# Patient Record
Sex: MALE | Race: WHITE | NOT HISPANIC OR LATINO | URBAN - METROPOLITAN AREA
[De-identification: names, ages, dates, MRNs, and addresses within clinical notes are randomized per-mention and may not be internally consistent; named-entity substitution may affect disease eponyms.]

---

## 2017-05-17 ENCOUNTER — OUTPATIENT (OUTPATIENT)
Dept: OUTPATIENT SERVICES | Facility: HOSPITAL | Age: 57
LOS: 1 days | End: 2017-05-17
Payer: COMMERCIAL

## 2017-05-17 PROCEDURE — A9552: CPT

## 2017-05-17 PROCEDURE — 78815 PET IMAGE W/CT SKULL-THIGH: CPT | Mod: 26

## 2017-05-17 PROCEDURE — 78815 PET IMAGE W/CT SKULL-THIGH: CPT

## 2017-05-25 ENCOUNTER — APPOINTMENT (OUTPATIENT)
Dept: PULMONOLOGY | Facility: CLINIC | Age: 57
End: 2017-05-25

## 2017-05-25 VITALS
TEMPERATURE: 97.9 F | HEART RATE: 77 BPM | OXYGEN SATURATION: 98 % | BODY MASS INDEX: 22.34 KG/M2 | DIASTOLIC BLOOD PRESSURE: 78 MMHG | WEIGHT: 139 LBS | SYSTOLIC BLOOD PRESSURE: 104 MMHG | HEIGHT: 66 IN

## 2017-05-25 VITALS — OXYGEN SATURATION: 98 % | HEART RATE: 75 BPM

## 2017-05-25 DIAGNOSIS — D49.59 NEOPLASM UNSPECIFIED BEHAVIOR OF OTHER GENITOURINARY ORGAN: ICD-10-CM

## 2017-05-25 DIAGNOSIS — R05 COUGH: ICD-10-CM

## 2017-05-25 DIAGNOSIS — Z87.891 PERSONAL HISTORY OF NICOTINE DEPENDENCE: ICD-10-CM

## 2017-05-25 DIAGNOSIS — C34.31 MALIGNANT NEOPLASM OF LOWER LOBE, RIGHT BRONCHUS OR LUNG: ICD-10-CM

## 2017-05-25 DIAGNOSIS — R59.0 LOCALIZED ENLARGED LYMPH NODES: ICD-10-CM

## 2017-05-26 ENCOUNTER — RESULT REVIEW (OUTPATIENT)
Age: 57
End: 2017-05-26

## 2017-05-26 ENCOUNTER — OUTPATIENT (OUTPATIENT)
Dept: OUTPATIENT SERVICES | Facility: HOSPITAL | Age: 57
LOS: 1 days | Discharge: HOME | End: 2017-05-26
Payer: COMMERCIAL

## 2017-05-26 ENCOUNTER — APPOINTMENT (OUTPATIENT)
Dept: PULMONOLOGY | Facility: HOSPITAL | Age: 57
End: 2017-05-26

## 2017-05-26 LAB
ANION GAP SERPL CALC-SCNC: 18 MMOL/L
APTT BLD: 32.6 SEC
BASOPHILS # BLD AUTO: 0.02 K/UL
BASOPHILS NFR BLD AUTO: 0.3 %
BUN SERPL-MCNC: 19 MG/DL
CALCIUM SERPL-MCNC: 10.1 MG/DL
CHLORIDE SERPL-SCNC: 101 MMOL/L
CO2 SERPL-SCNC: 23 MMOL/L
CREAT SERPL-MCNC: 1.16 MG/DL
EOSINOPHIL # BLD AUTO: 0.08 K/UL
EOSINOPHIL NFR BLD AUTO: 1.4 %
GLUCOSE SERPL-MCNC: 82 MG/DL
HCT VFR BLD CALC: 47.5 %
HGB BLD-MCNC: 15.1 G/DL
IMM GRANULOCYTES NFR BLD AUTO: 0.2 %
INR PPP: 0.96 RATIO
LYMPHOCYTES # BLD AUTO: 0.92 K/UL
LYMPHOCYTES NFR BLD AUTO: 16 %
MAN DIFF?: NORMAL
MCHC RBC-ENTMCNC: 28.5 PG
MCHC RBC-ENTMCNC: 31.8 GM/DL
MCV RBC AUTO: 89.8 FL
MONOCYTES # BLD AUTO: 0.32 K/UL
MONOCYTES NFR BLD AUTO: 5.6 %
NEUTROPHILS # BLD AUTO: 4.4 K/UL
NEUTROPHILS NFR BLD AUTO: 76.5 %
PLATELET # BLD AUTO: 252 K/UL
POTASSIUM SERPL-SCNC: 4.7 MMOL/L
PT BLD: 10.8 SEC
RBC # BLD: 5.29 M/UL
RBC # FLD: 13.7 %
SODIUM SERPL-SCNC: 142 MMOL/L
WBC # FLD AUTO: 5.75 K/UL

## 2017-05-26 PROCEDURE — 87206 SMEAR FLUORESCENT/ACID STAI: CPT

## 2017-05-26 PROCEDURE — 31653 BRONCH EBUS SAMPLNG 3/> NODE: CPT

## 2017-05-26 PROCEDURE — 88342 IMHCHEM/IMCYTCHM 1ST ANTB: CPT

## 2017-05-26 PROCEDURE — 87070 CULTURE OTHR SPECIMN AEROBIC: CPT

## 2017-05-26 PROCEDURE — 87015 SPECIMEN INFECT AGNT CONCNTJ: CPT

## 2017-05-26 PROCEDURE — 88341 IMHCHEM/IMCYTCHM EA ADD ANTB: CPT

## 2017-05-26 PROCEDURE — 88173 CYTOPATH EVAL FNA REPORT: CPT

## 2017-05-26 PROCEDURE — 87116 MYCOBACTERIA CULTURE: CPT

## 2017-05-26 PROCEDURE — 87102 FUNGUS ISOLATION CULTURE: CPT

## 2017-05-26 PROCEDURE — 88305 TISSUE EXAM BY PATHOLOGIST: CPT

## 2017-05-27 LAB
GRAM STN FLD: SIGNIFICANT CHANGE UP
NIGHT BLUE STAIN TISS: SIGNIFICANT CHANGE UP
SPECIMEN SOURCE: SIGNIFICANT CHANGE UP
SPECIMEN SOURCE: SIGNIFICANT CHANGE UP

## 2017-05-28 LAB
CULTURE RESULTS: SIGNIFICANT CHANGE UP
SPECIMEN SOURCE: SIGNIFICANT CHANGE UP

## 2017-05-30 LAB
NON-GYNECOLOGICAL CYTOLOGY STUDY: SIGNIFICANT CHANGE UP

## 2017-06-01 DIAGNOSIS — C77.1 SECONDARY AND UNSPECIFIED MALIGNANT NEOPLASM OF INTRATHORACIC LYMPH NODES: ICD-10-CM

## 2017-06-01 DIAGNOSIS — Z85.118 PERSONAL HISTORY OF OTHER MALIGNANT NEOPLASM OF BRONCHUS AND LUNG: ICD-10-CM

## 2017-06-24 LAB
CULTURE RESULTS: SIGNIFICANT CHANGE UP
SPECIMEN SOURCE: SIGNIFICANT CHANGE UP

## 2017-07-08 LAB
CULTURE RESULTS: SIGNIFICANT CHANGE UP
SPECIMEN SOURCE: SIGNIFICANT CHANGE UP

## 2018-02-03 ENCOUNTER — APPOINTMENT (OUTPATIENT)
Dept: CT IMAGING | Facility: HOSPITAL | Age: 58
End: 2018-02-03

## 2018-02-03 ENCOUNTER — OUTPATIENT (OUTPATIENT)
Dept: OUTPATIENT SERVICES | Facility: HOSPITAL | Age: 58
LOS: 1 days | End: 2018-02-03
Payer: COMMERCIAL

## 2018-02-03 PROCEDURE — 74177 CT ABD & PELVIS W/CONTRAST: CPT

## 2018-02-03 PROCEDURE — 71260 CT THORAX DX C+: CPT

## 2018-02-03 PROCEDURE — 70460 CT HEAD/BRAIN W/DYE: CPT | Mod: 26

## 2018-02-03 PROCEDURE — 70460 CT HEAD/BRAIN W/DYE: CPT

## 2018-02-03 PROCEDURE — 74177 CT ABD & PELVIS W/CONTRAST: CPT | Mod: 26

## 2018-02-03 PROCEDURE — 71260 CT THORAX DX C+: CPT | Mod: 26

## 2018-11-03 ENCOUNTER — APPOINTMENT (OUTPATIENT)
Dept: CT IMAGING | Facility: HOSPITAL | Age: 58
End: 2018-11-03
Payer: COMMERCIAL

## 2018-11-03 ENCOUNTER — OUTPATIENT (OUTPATIENT)
Dept: OUTPATIENT SERVICES | Facility: HOSPITAL | Age: 58
LOS: 1 days | End: 2018-11-03
Payer: COMMERCIAL

## 2018-11-03 PROCEDURE — 71260 CT THORAX DX C+: CPT | Mod: 26

## 2018-11-03 PROCEDURE — 71260 CT THORAX DX C+: CPT

## 2018-11-03 PROCEDURE — 74177 CT ABD & PELVIS W/CONTRAST: CPT | Mod: 26

## 2018-11-03 PROCEDURE — 74177 CT ABD & PELVIS W/CONTRAST: CPT

## 2019-01-31 ENCOUNTER — APPOINTMENT (OUTPATIENT)
Dept: CT IMAGING | Facility: HOSPITAL | Age: 59
End: 2019-01-31
Payer: COMMERCIAL

## 2019-01-31 ENCOUNTER — OUTPATIENT (OUTPATIENT)
Dept: OUTPATIENT SERVICES | Facility: HOSPITAL | Age: 59
LOS: 1 days | End: 2019-01-31
Payer: COMMERCIAL

## 2019-01-31 PROCEDURE — 71260 CT THORAX DX C+: CPT

## 2019-01-31 PROCEDURE — 74177 CT ABD & PELVIS W/CONTRAST: CPT | Mod: 26

## 2019-01-31 PROCEDURE — 70460 CT HEAD/BRAIN W/DYE: CPT | Mod: 26

## 2019-01-31 PROCEDURE — 71260 CT THORAX DX C+: CPT | Mod: 26

## 2019-01-31 PROCEDURE — 70460 CT HEAD/BRAIN W/DYE: CPT

## 2019-01-31 PROCEDURE — 74177 CT ABD & PELVIS W/CONTRAST: CPT

## 2019-03-20 ENCOUNTER — APPOINTMENT (OUTPATIENT)
Age: 59
End: 2019-03-20
Payer: COMMERCIAL

## 2019-03-20 ENCOUNTER — OUTPATIENT (OUTPATIENT)
Dept: OUTPATIENT SERVICES | Facility: HOSPITAL | Age: 59
LOS: 1 days | End: 2019-03-20
Payer: COMMERCIAL

## 2019-03-20 PROCEDURE — 70460 CT HEAD/BRAIN W/DYE: CPT | Mod: 26

## 2019-03-20 PROCEDURE — 70460 CT HEAD/BRAIN W/DYE: CPT

## 2019-05-24 ENCOUNTER — OUTPATIENT (OUTPATIENT)
Dept: OUTPATIENT SERVICES | Facility: HOSPITAL | Age: 59
LOS: 1 days | End: 2019-05-24
Payer: COMMERCIAL

## 2019-05-24 ENCOUNTER — APPOINTMENT (OUTPATIENT)
Dept: CT IMAGING | Facility: HOSPITAL | Age: 59
End: 2019-05-24
Payer: COMMERCIAL

## 2019-05-24 PROCEDURE — 74177 CT ABD & PELVIS W/CONTRAST: CPT | Mod: 26

## 2019-05-24 PROCEDURE — 71260 CT THORAX DX C+: CPT

## 2019-05-24 PROCEDURE — 70460 CT HEAD/BRAIN W/DYE: CPT | Mod: 26

## 2019-05-24 PROCEDURE — 74177 CT ABD & PELVIS W/CONTRAST: CPT

## 2019-05-24 PROCEDURE — 71260 CT THORAX DX C+: CPT | Mod: 26

## 2019-05-24 PROCEDURE — 70460 CT HEAD/BRAIN W/DYE: CPT

## 2019-09-16 ENCOUNTER — APPOINTMENT (OUTPATIENT)
Dept: CT IMAGING | Facility: HOSPITAL | Age: 59
End: 2019-09-16
Payer: COMMERCIAL

## 2019-09-17 ENCOUNTER — OUTPATIENT (OUTPATIENT)
Dept: OUTPATIENT SERVICES | Facility: HOSPITAL | Age: 59
LOS: 1 days | End: 2019-09-17
Payer: COMMERCIAL

## 2019-09-17 ENCOUNTER — APPOINTMENT (OUTPATIENT)
Dept: CT IMAGING | Facility: HOSPITAL | Age: 59
End: 2019-09-17

## 2019-09-17 PROCEDURE — 70460 CT HEAD/BRAIN W/DYE: CPT

## 2019-09-17 PROCEDURE — 70460 CT HEAD/BRAIN W/DYE: CPT | Mod: 26

## 2020-01-11 ENCOUNTER — APPOINTMENT (OUTPATIENT)
Dept: CT IMAGING | Facility: HOSPITAL | Age: 60
End: 2020-01-11
Payer: COMMERCIAL

## 2020-01-11 ENCOUNTER — OUTPATIENT (OUTPATIENT)
Dept: OUTPATIENT SERVICES | Facility: HOSPITAL | Age: 60
LOS: 1 days | End: 2020-01-11
Payer: COMMERCIAL

## 2020-01-11 PROCEDURE — 74177 CT ABD & PELVIS W/CONTRAST: CPT

## 2020-01-11 PROCEDURE — 71260 CT THORAX DX C+: CPT

## 2020-01-11 PROCEDURE — 74177 CT ABD & PELVIS W/CONTRAST: CPT | Mod: 26

## 2020-01-11 PROCEDURE — 71260 CT THORAX DX C+: CPT | Mod: 26

## 2020-01-13 ENCOUNTER — OUTPATIENT (OUTPATIENT)
Dept: OUTPATIENT SERVICES | Facility: HOSPITAL | Age: 60
LOS: 1 days | End: 2020-01-13
Payer: COMMERCIAL

## 2020-01-13 ENCOUNTER — APPOINTMENT (OUTPATIENT)
Dept: CT IMAGING | Facility: HOSPITAL | Age: 60
End: 2020-01-13
Payer: COMMERCIAL

## 2020-01-13 PROCEDURE — 70460 CT HEAD/BRAIN W/DYE: CPT

## 2020-01-13 PROCEDURE — 70460 CT HEAD/BRAIN W/DYE: CPT | Mod: 26

## 2020-08-05 ENCOUNTER — APPOINTMENT (OUTPATIENT)
Dept: CT IMAGING | Facility: HOSPITAL | Age: 60
End: 2020-08-05
Payer: COMMERCIAL

## 2020-08-05 ENCOUNTER — OUTPATIENT (OUTPATIENT)
Dept: OUTPATIENT SERVICES | Facility: HOSPITAL | Age: 60
LOS: 1 days | End: 2020-08-05
Payer: COMMERCIAL

## 2020-08-05 PROCEDURE — 71270 CT THORAX DX C-/C+: CPT

## 2020-08-05 PROCEDURE — 70470 CT HEAD/BRAIN W/O & W/DYE: CPT | Mod: 26

## 2020-08-05 PROCEDURE — 74177 CT ABD & PELVIS W/CONTRAST: CPT | Mod: 26

## 2020-08-05 PROCEDURE — 71270 CT THORAX DX C-/C+: CPT | Mod: 26

## 2020-08-05 PROCEDURE — 74177 CT ABD & PELVIS W/CONTRAST: CPT

## 2020-08-05 PROCEDURE — 70470 CT HEAD/BRAIN W/O & W/DYE: CPT

## 2021-02-06 ENCOUNTER — OUTPATIENT (OUTPATIENT)
Dept: OUTPATIENT SERVICES | Facility: HOSPITAL | Age: 61
LOS: 1 days | End: 2021-02-06
Payer: COMMERCIAL

## 2021-02-06 ENCOUNTER — APPOINTMENT (OUTPATIENT)
Dept: CT IMAGING | Facility: HOSPITAL | Age: 61
End: 2021-02-06
Payer: COMMERCIAL

## 2021-02-06 PROCEDURE — 74177 CT ABD & PELVIS W/CONTRAST: CPT

## 2021-02-06 PROCEDURE — 70460 CT HEAD/BRAIN W/DYE: CPT

## 2021-02-06 PROCEDURE — 70460 CT HEAD/BRAIN W/DYE: CPT | Mod: 26

## 2021-02-06 PROCEDURE — 71260 CT THORAX DX C+: CPT | Mod: 26

## 2021-02-06 PROCEDURE — 74177 CT ABD & PELVIS W/CONTRAST: CPT | Mod: 26

## 2021-02-06 PROCEDURE — 71260 CT THORAX DX C+: CPT

## 2021-02-09 DIAGNOSIS — C34.90 MALIGNANT NEOPLASM OF UNSPECIFIED PART OF UNSPECIFIED BRONCHUS OR LUNG: ICD-10-CM

## 2021-02-09 DIAGNOSIS — Z85.118 PERSONAL HISTORY OF OTHER MALIGNANT NEOPLASM OF BRONCHUS AND LUNG: ICD-10-CM

## 2021-02-09 DIAGNOSIS — R91.8 OTHER NONSPECIFIC ABNORMAL FINDING OF LUNG FIELD: ICD-10-CM

## 2021-02-09 DIAGNOSIS — N40.0 BENIGN PROSTATIC HYPERPLASIA WITHOUT LOWER URINARY TRACT SYMPTOMS: ICD-10-CM

## 2021-02-09 DIAGNOSIS — K80.20 CALCULUS OF GALLBLADDER WITHOUT CHOLECYSTITIS WITHOUT OBSTRUCTION: ICD-10-CM

## 2021-04-01 ENCOUNTER — APPOINTMENT (OUTPATIENT)
Dept: UROLOGY | Facility: CLINIC | Age: 61
End: 2021-04-01
Payer: COMMERCIAL

## 2021-04-01 VITALS — SYSTOLIC BLOOD PRESSURE: 124 MMHG | DIASTOLIC BLOOD PRESSURE: 77 MMHG | TEMPERATURE: 98.3 F

## 2021-04-01 PROCEDURE — 99204 OFFICE O/P NEW MOD 45 MIN: CPT

## 2021-04-01 PROCEDURE — 99072 ADDL SUPL MATRL&STAF TM PHE: CPT

## 2021-04-01 PROCEDURE — 51798 US URINE CAPACITY MEASURE: CPT | Mod: 59

## 2021-04-01 PROCEDURE — 76872 US TRANSRECTAL: CPT

## 2021-04-01 NOTE — PHYSICAL EXAM
[Prostate Tenderness] : the prostate was not tender [No Prostate Nodules] : no prostate nodules [Prostate Size ___ gm] : prostate size [unfilled] gm [General Appearance - Well Developed] : well developed [General Appearance - Well Nourished] : well nourished [Normal Appearance] : normal appearance [Well Groomed] : well groomed [General Appearance - In No Acute Distress] : no acute distress [Edema] : no peripheral edema [Urethral Meatus] : meatus normal [Scrotum] : the scrotum was normal [Testes Tenderness] : no tenderness of the testes [Testes Mass (___cm)] : there were no testicular masses [Normal Station and Gait] : the gait and station were normal for the patient's age [] : no rash [No Focal Deficits] : no focal deficits [Oriented To Time, Place, And Person] : oriented to person, place, and time [Affect] : the affect was normal [Mood] : the mood was normal [Not Anxious] : not anxious

## 2021-04-01 NOTE — HISTORY OF PRESENT ILLNESS
[FreeTextEntry1] : This is a 61 year old male with PMx significant for testicular cancer s/p orchiectomy (seminoma stage I), right lung lobectomy, now with metastatic lung cancer to the right supraclavicular lymph nodes and brain who presents today with an elevated PSA.  He is a former patient of Dr. Wolfe who diagnosed him with testicular cancer ~10 years ago.  While undergoing workup for testicular cancer, nodule found on lung and patient underwent right lung fine needle biopsy which was positive for primary adenocarcinoma of the lung and then eventually right lobectomy.  Currently on Alecensa for his metastatic lung cancer, this is the second oral chemotherapy agent patient has been on an is being managed by Dr. Andreas Schneider.\par \par His PSA two years ago was under 4 and on 3/25/21 PSA was found to be 5.3 (not repeated).\par Reports some mild urinary symptoms such as daytime frequency "feels the need to void every hour", mild urgency, intermittency of the stream, and sense of incomplete emptying\par \par His brother who is currently 64 was diagnosed with prostate cancer and was on surveillance but just recently completed radiation.\par \par Surgical Hx: right side lymph node removal upper chest\par Social Hx: nonsmoker, rare ETOH,  with 3 children\par Family Hx: breast cancer in mom and dad both are alive and well, brother with prostate cancer\par \par \par

## 2021-04-01 NOTE — ASSESSMENT
[FreeTextEntry1] : 61 year old male with elevated PSA who is currently being treated for metastatic lung cancer\par -repeat PSA today\par -check UCx\par -prostate US today shows volume of 55 cc \par -Initial PVR was 285 cc, second void PVR was 190 cc\par -PSA density 0.09, discussed proceeding with prostate MRI.  Patient elects surveillance at this time.  He has extreme difficulty undergoing MRIs \par -Trial of Tamsulosin 0.04 mgm reviewed goals and side effects of medication.  Patient is going to check with his oncology pharmacist to ensure medication ok to take and will get back to us if he is not going to take the medication.\par \par RTC in 4-6 weeks\par

## 2021-04-02 LAB — PSA SERPL-MCNC: 5.35 NG/ML

## 2021-05-13 ENCOUNTER — APPOINTMENT (OUTPATIENT)
Dept: UROLOGY | Facility: CLINIC | Age: 61
End: 2021-05-13
Payer: COMMERCIAL

## 2021-05-13 PROCEDURE — 51798 US URINE CAPACITY MEASURE: CPT

## 2021-05-13 PROCEDURE — 99214 OFFICE O/P EST MOD 30 MIN: CPT

## 2021-05-13 PROCEDURE — 99072 ADDL SUPL MATRL&STAF TM PHE: CPT

## 2021-05-13 NOTE — ASSESSMENT
[FreeTextEntry1] : 61 year old male with history of metastatic lung cancer to the brain now with elevated PVR\par -prostate volume which was measured last visit was 55 cc\par -PVR remains elevated today on Tamsulosin\par -given current health state will start with UDS before moving forward with discussion of outlet procedure.\par \par \par Follow up after UDS

## 2021-05-13 NOTE — HISTORY OF PRESENT ILLNESS
[FreeTextEntry1] : This is a 61 year old male who being followed  by oncologist for metastatic lung cancer to the brain and right supraclavicular lymph node who initially presented for elevated PSA.  He has a pMHx significant for testicular cancer s/p orchiectomy (seminoma stage I) and right lung lobectomy .  He reports his CEA has been rising, due for oncology follow up in a ~month.\par \par PSA was repeated on 4/1/21 and was 5.35 which was the same as outside PSA on 3/25/21.  At time of visit was found to have elevated PVR of 285 cc and second void PVR was 190 cc.\par \par PVR today 323 cc today. \par Started Tamsulosin 0.4 mg for the past month, no significant change in urinary symptoms.  Still reports weak urinary stream, but feels that has been ongoing for numerous years.   He reports no significant urinary complaints at this time does not feel the need to push/strain to urinate.\par Feels urgency slightly decreased \par \par \par

## 2021-05-14 ENCOUNTER — APPOINTMENT (OUTPATIENT)
Dept: UROLOGY | Facility: CLINIC | Age: 61
End: 2021-05-14
Payer: COMMERCIAL

## 2021-05-14 VITALS — HEART RATE: 102 BPM | SYSTOLIC BLOOD PRESSURE: 141 MMHG | DIASTOLIC BLOOD PRESSURE: 72 MMHG

## 2021-05-14 DIAGNOSIS — R97.20 ELEVATED PROSTATE, SPECIFIC ANTIGEN [PSA]: ICD-10-CM

## 2021-05-14 PROCEDURE — 99214 OFFICE O/P EST MOD 30 MIN: CPT

## 2021-05-14 PROCEDURE — 99072 ADDL SUPL MATRL&STAF TM PHE: CPT

## 2021-05-15 ENCOUNTER — LABORATORY RESULT (OUTPATIENT)
Age: 61
End: 2021-05-15

## 2021-05-26 ENCOUNTER — APPOINTMENT (OUTPATIENT)
Dept: UROLOGY | Facility: CLINIC | Age: 61
End: 2021-05-26

## 2021-06-02 ENCOUNTER — RESULT CHARGE (OUTPATIENT)
Age: 61
End: 2021-06-02

## 2021-06-03 ENCOUNTER — APPOINTMENT (OUTPATIENT)
Dept: UROLOGY | Facility: CLINIC | Age: 61
End: 2021-06-03
Payer: COMMERCIAL

## 2021-06-03 VITALS
TEMPERATURE: 97.1 F | DIASTOLIC BLOOD PRESSURE: 75 MMHG | SYSTOLIC BLOOD PRESSURE: 142 MMHG | OXYGEN SATURATION: 97 % | HEART RATE: 87 BPM

## 2021-06-03 DIAGNOSIS — N40.1 BENIGN PROSTATIC HYPERPLASIA WITH LOWER URINARY TRACT SYMPMS: ICD-10-CM

## 2021-06-03 DIAGNOSIS — R33.8 BENIGN PROSTATIC HYPERPLASIA WITH LOWER URINARY TRACT SYMPMS: ICD-10-CM

## 2021-06-03 PROCEDURE — 51728 CYSTOMETROGRAM W/VP: CPT

## 2021-06-03 PROCEDURE — 51798 US URINE CAPACITY MEASURE: CPT

## 2021-06-03 PROCEDURE — 51784 ANAL/URINARY MUSCLE STUDY: CPT

## 2021-06-03 PROCEDURE — 99072 ADDL SUPL MATRL&STAF TM PHE: CPT

## 2021-06-03 PROCEDURE — 51797 INTRAABDOMINAL PRESSURE TEST: CPT

## 2021-06-03 PROCEDURE — 51741 ELECTRO-UROFLOWMETRY FIRST: CPT

## 2021-06-09 LAB
BILIRUB UR QL STRIP: NORMAL
CLARITY UR: CLEAR
COLLECTION METHOD: NORMAL
GLUCOSE UR-MCNC: NORMAL
HCG UR QL: 0.2 EU/DL
HGB UR QL STRIP.AUTO: NORMAL
KETONES UR-MCNC: NORMAL
LEUKOCYTE ESTERASE UR QL STRIP: NORMAL
NITRITE UR QL STRIP: NORMAL
PH UR STRIP: 6.5
PROT UR STRIP-MCNC: NORMAL
SP GR UR STRIP: >=1.03

## 2021-06-24 ENCOUNTER — APPOINTMENT (OUTPATIENT)
Dept: UROLOGY | Facility: CLINIC | Age: 61
End: 2021-06-24
Payer: COMMERCIAL

## 2021-06-24 VITALS — HEART RATE: 89 BPM | DIASTOLIC BLOOD PRESSURE: 74 MMHG | SYSTOLIC BLOOD PRESSURE: 135 MMHG | TEMPERATURE: 98.1 F

## 2021-06-24 PROCEDURE — 99072 ADDL SUPL MATRL&STAF TM PHE: CPT

## 2021-06-24 PROCEDURE — 99214 OFFICE O/P EST MOD 30 MIN: CPT

## 2021-06-24 NOTE — HISTORY OF PRESENT ILLNESS
[Urinary Urgency] : urinary urgency [Urinary Frequency] : urinary frequency [Nocturia] : nocturia [None] : None [FreeTextEntry1] : CC: elevated PSA, LUTS, urinary retention \par \par Patient with metastatic lung cancer to the brain and right supraclavicular lymph node\par Testicular cancer s/p orchiectomy (seminoma stage I) and right lung lobectomy. \par \par PSA 4/1/21  5.35 \par PVR of 285 cc and second void PVR was 190 cc.\par PVR today 323 cc last visit \par Tamsulosin 0.4 mg \par US: 55 cc prostate \par \par Symptoms have included weak urinary stream and frequency; nocturia x 1; not overly bothered by urgency. \par \par UDS with Dr. Guadarrama demonstrated HARGROVE, with elevated voiding pressures,  cc during UDS, and then catheters removed and  cc. \par \par Discussed surgical options (TURP, HoLEP or Rezum) vs. watchful waiting vs. Tamsulosin. \par \par  [Urinary Incontinence] : no urinary incontinence [Urinary Retention] : no urinary retention [Dysuria] : no dysuria [Hematuria - Gross] : no gross hematuria

## 2021-06-24 NOTE — ASSESSMENT
[FreeTextEntry1] : 61M with BPH/LUTS\par \par Complicated medical Hx with current treatment for metastatic lung cancer to brain. Doing well. Patient reports he is expected to have a reasonable life expectancy at this time. \par \par PSA 4/1/21 5.35 \par PVR of 285 cc and second void PVR was 190 cc.\par PVR today 323 cc last visit \par Tamsulosin 0.4 mg \par US: 55 cc prostate \par UDS - HARGROVE - BOOI 177 with elevated voiding pressures and . \par \par Plan - \par 1. Dr. Saleem for [facility] vs.Rezum consultation

## 2021-07-13 ENCOUNTER — OUTPATIENT (OUTPATIENT)
Dept: OUTPATIENT SERVICES | Facility: HOSPITAL | Age: 61
LOS: 1 days | End: 2021-07-13
Payer: COMMERCIAL

## 2021-07-13 ENCOUNTER — APPOINTMENT (OUTPATIENT)
Dept: CT IMAGING | Facility: HOSPITAL | Age: 61
End: 2021-07-13
Payer: COMMERCIAL

## 2021-07-13 PROCEDURE — 74177 CT ABD & PELVIS W/CONTRAST: CPT

## 2021-07-13 PROCEDURE — 74177 CT ABD & PELVIS W/CONTRAST: CPT | Mod: 26

## 2021-07-13 PROCEDURE — 71260 CT THORAX DX C+: CPT | Mod: 26

## 2021-07-13 PROCEDURE — 71260 CT THORAX DX C+: CPT

## 2021-07-27 ENCOUNTER — APPOINTMENT (OUTPATIENT)
Dept: CT IMAGING | Facility: HOSPITAL | Age: 61
End: 2021-07-27
Payer: COMMERCIAL

## 2021-07-27 ENCOUNTER — OUTPATIENT (OUTPATIENT)
Dept: OUTPATIENT SERVICES | Facility: HOSPITAL | Age: 61
LOS: 1 days | End: 2021-07-27
Payer: COMMERCIAL

## 2021-07-27 PROCEDURE — 70470 CT HEAD/BRAIN W/O & W/DYE: CPT | Mod: 26

## 2021-07-27 PROCEDURE — 70470 CT HEAD/BRAIN W/O & W/DYE: CPT

## 2021-08-18 ENCOUNTER — OUTPATIENT (OUTPATIENT)
Dept: OUTPATIENT SERVICES | Facility: HOSPITAL | Age: 61
LOS: 1 days | End: 2021-08-18
Payer: COMMERCIAL

## 2021-08-18 PROCEDURE — A9552: CPT

## 2021-08-18 PROCEDURE — 78815 PET IMAGE W/CT SKULL-THIGH: CPT

## 2021-08-18 PROCEDURE — 78815 PET IMAGE W/CT SKULL-THIGH: CPT | Mod: 26

## 2021-08-18 PROCEDURE — 82962 GLUCOSE BLOOD TEST: CPT

## 2021-10-11 ENCOUNTER — LABORATORY RESULT (OUTPATIENT)
Age: 61
End: 2021-10-11

## 2021-10-14 ENCOUNTER — RESULT REVIEW (OUTPATIENT)
Age: 61
End: 2021-10-14

## 2021-10-14 ENCOUNTER — OUTPATIENT (OUTPATIENT)
Dept: OUTPATIENT SERVICES | Facility: HOSPITAL | Age: 61
LOS: 1 days | Discharge: HOME | End: 2021-10-14
Payer: COMMERCIAL

## 2021-10-14 ENCOUNTER — APPOINTMENT (OUTPATIENT)
Age: 61
End: 2021-10-14

## 2021-10-14 PROCEDURE — 88305 TISSUE EXAM BY PATHOLOGIST: CPT | Mod: 26

## 2021-10-14 PROCEDURE — 88173 CYTOPATH EVAL FNA REPORT: CPT

## 2021-10-14 PROCEDURE — 88344 IMHCHEM/IMCYTCHM EA MLT ANTB: CPT

## 2021-10-14 PROCEDURE — 88342 IMHCHEM/IMCYTCHM 1ST ANTB: CPT | Mod: 26,59

## 2021-10-14 PROCEDURE — 88305 TISSUE EXAM BY PATHOLOGIST: CPT

## 2021-10-14 PROCEDURE — 88173 CYTOPATH EVAL FNA REPORT: CPT | Mod: 26

## 2021-10-14 PROCEDURE — 88342 IMHCHEM/IMCYTCHM 1ST ANTB: CPT

## 2021-10-14 PROCEDURE — 43242 EGD US FINE NEEDLE BX/ASPIR: CPT

## 2021-10-14 PROCEDURE — 88344 IMHCHEM/IMCYTCHM EA MLT ANTB: CPT | Mod: 26

## 2021-10-18 LAB — NON-GYNECOLOGICAL CYTOLOGY STUDY: SIGNIFICANT CHANGE UP

## 2022-03-03 ENCOUNTER — APPOINTMENT (OUTPATIENT)
Dept: CT IMAGING | Facility: CLINIC | Age: 62
End: 2022-03-03

## 2022-03-03 ENCOUNTER — OUTPATIENT (OUTPATIENT)
Dept: OUTPATIENT SERVICES | Facility: HOSPITAL | Age: 62
LOS: 1 days | End: 2022-03-03
Payer: COMMERCIAL

## 2022-03-03 ENCOUNTER — APPOINTMENT (OUTPATIENT)
Dept: CT IMAGING | Facility: HOSPITAL | Age: 62
End: 2022-03-03

## 2022-03-03 PROCEDURE — 71260 CT THORAX DX C+: CPT | Mod: 26

## 2022-03-03 PROCEDURE — 74177 CT ABD & PELVIS W/CONTRAST: CPT

## 2022-03-03 PROCEDURE — 74177 CT ABD & PELVIS W/CONTRAST: CPT | Mod: 26

## 2022-03-03 PROCEDURE — 71260 CT THORAX DX C+: CPT

## 2022-03-03 PROCEDURE — 82565 ASSAY OF CREATININE: CPT

## 2022-06-12 ENCOUNTER — APPOINTMENT (OUTPATIENT)
Dept: ORTHOPEDIC SURGERY | Facility: CLINIC | Age: 62
End: 2022-06-12
Payer: COMMERCIAL

## 2022-06-12 VITALS — HEIGHT: 66 IN | WEIGHT: 150 LBS | BODY MASS INDEX: 24.11 KG/M2

## 2022-06-12 DIAGNOSIS — M25.562 PAIN IN LEFT KNEE: ICD-10-CM

## 2022-06-12 PROCEDURE — 73564 X-RAY EXAM KNEE 4 OR MORE: CPT | Mod: LT

## 2022-06-12 PROCEDURE — 99214 OFFICE O/P EST MOD 30 MIN: CPT

## 2022-06-12 PROCEDURE — L1820: CPT

## 2022-06-12 NOTE — PHYSICAL EXAM
[5___] : quadriceps 5[unfilled]/5 [Equivocal] : equivocal Vernon [] : patient ambulates without assistive device [Left] : left knee [There are no fractures, subluxations or dislocations. No significant abnormalities are seen] : There are no fractures, subluxations or dislocations. No significant abnormalities are seen [FreeTextEntry9] : Mild knee OA [TWNoteComboBox7] : flexion 115 degrees [de-identified] : extension 0 degrees

## 2022-06-12 NOTE — HISTORY OF PRESENT ILLNESS
[7] : 7 [6] : 6 [Dull/Aching] : dull/aching [Constant] : constant [Ice] : ice [de-identified] : 61yo M with left medial knee pain since earlier this morning, 6/12/22, after he had swung his golf club and twisted. Denies prior knee injury. WB in sneakers today. Tried ice to minimal relief. [] : no [FreeTextEntry1] : L knee [FreeTextEntry3] : 6/12/22 [FreeTextEntry5] : was golfing this morning and felt something in his knee when he swang . can not bear weight on leg [de-identified] : activity

## 2022-06-12 NOTE — ASSESSMENT
[FreeTextEntry1] : Hinged knee brace dispensed\par Mobic \par PT/HEP\par f/up in 6 weeks, consider MRI vs. CSI if symptoms fail to improve or worsen

## 2022-07-28 ENCOUNTER — APPOINTMENT (OUTPATIENT)
Dept: ORTHOPEDIC SURGERY | Facility: CLINIC | Age: 62
End: 2022-07-28

## 2022-07-28 VITALS — WEIGHT: 150 LBS | HEIGHT: 66 IN | BODY MASS INDEX: 24.11 KG/M2

## 2022-07-28 PROCEDURE — 20610 DRAIN/INJ JOINT/BURSA W/O US: CPT | Mod: LT

## 2022-07-28 PROCEDURE — J3490M: CUSTOM

## 2022-07-28 PROCEDURE — 99204 OFFICE O/P NEW MOD 45 MIN: CPT | Mod: 25

## 2022-07-28 NOTE — ASSESSMENT
[FreeTextEntry1] : Imaging was reviewed and independently interpreted\par xrays 6/12/22 - mid med and pf deg\par \par \par \par Due to the patients mechanical symptoms along with medial joint line pain, effusion, and pos salas test on exam we will get an mri to eval for medial meniscus tear\par \par - The patient was advised to apply ice (wrapped in a towel or protective covering) to the area daily (20 minutes at a time, 2-4X/day).\par - The patient was advised to modify their activities.\par - We also discussed the possible of a corticosteroid injection in order to help decrease inflammation and pain so that they can perform better therapy and they wished to proceed with this treatment course.\par - f/u after mri\par \par

## 2022-07-28 NOTE — IMAGING
[de-identified] : \par LEFT KNEE\par Inspection:  mild effusion\par Palpation: medial joint line tenderness \par Knee Range of Motion:  0-130 \par Strength: 5/5 Quadriceps strength, 5/5 Hamstring strength\par Neurological: light touch is intact throughout\par Ligament Stability and Special Tests: \par McMurrays: Positive\par Lachman: neg\par Pivot Shift: neg\par Posterior Drawer: neg\par Valgus: neg\par Varus: neg\par Patella Apprehension: neg\par Patella Maltracking: neg\par

## 2022-07-28 NOTE — HISTORY OF PRESENT ILLNESS
[de-identified] : 62 year old male ( LHD, retired) left knee pain since twisted injury swinging golf club on 6/12/22.  Since then pain medial and deep worse with activity and better at rest. assoc with catching and buckling.  went to  saw CARMEN Cabrera and got brace and sent for PT.  Has also been icing and taking Meloxicam with mild relief.\par \par PMHx lung ca with mets to brain in remission on immunotherapy

## 2022-08-03 ENCOUNTER — FORM ENCOUNTER (OUTPATIENT)
Age: 62
End: 2022-08-03

## 2022-08-04 ENCOUNTER — APPOINTMENT (OUTPATIENT)
Dept: MRI IMAGING | Facility: CLINIC | Age: 62
End: 2022-08-04

## 2022-08-04 PROCEDURE — 73721 MRI JNT OF LWR EXTRE W/O DYE: CPT | Mod: LT

## 2022-08-08 ENCOUNTER — RX RENEWAL (OUTPATIENT)
Age: 62
End: 2022-08-08

## 2022-08-10 PROBLEM — M65.9 SYNOVITIS OF KNEE: Status: ACTIVE | Noted: 2022-08-10

## 2022-08-10 PROBLEM — M17.12 ARTHRITIS OF KNEE, LEFT: Status: RESOLVED | Noted: 2022-07-27 | Resolved: 2022-08-10

## 2022-08-11 ENCOUNTER — APPOINTMENT (OUTPATIENT)
Dept: ORTHOPEDIC SURGERY | Facility: CLINIC | Age: 62
End: 2022-08-11

## 2022-08-11 VITALS — HEIGHT: 66 IN | WEIGHT: 150 LBS | BODY MASS INDEX: 24.11 KG/M2

## 2022-08-11 DIAGNOSIS — M17.12 UNILATERAL PRIMARY OSTEOARTHRITIS, LEFT KNEE: ICD-10-CM

## 2022-08-11 DIAGNOSIS — M65.9 SYNOVITIS AND TENOSYNOVITIS, UNSPECIFIED: ICD-10-CM

## 2022-08-11 PROCEDURE — 99215 OFFICE O/P EST HI 40 MIN: CPT

## 2022-08-11 NOTE — ASSESSMENT
[FreeTextEntry1] : mri left knee 8/4/22 - severe complex MMT, synov, eff, pop cyst, tricomp deg worst medial with subchondral edema\par \par - We discussed their diagnosis and treatment options at length. \par - Given their active lifestyle along with continued pain and mechanical symptoms despite conservative treatment they are a surgical candidate.\par - The risks, benefits, and alternatives to left knee PMM, synov were discussed with the patient, all questions were answered.\par (higher risk complications given his lung ca and former smoking history, in addition to poorero outcomes in knee with arthritis in addition to MMT)\par \par \par

## 2022-08-11 NOTE — HISTORY OF PRESENT ILLNESS
[de-identified] : 62 year old male ( LHD, retired) left knee pain since twisted injury swinging golf club on 6/12/22.  Since then pain medial and deep worse with activity and better at rest. assoc with catching and buckling.  went to  saw CARMEN Cabrera and got brace and sent for PT.  Has also been icing and taking Meloxicam with mild relief.\par \par PMHx lung ca with mets to brain in remission on immunotherapy\par \par 8/11/22 - had CSI (7/28) with mild relief still medial pan and catching and locking worse with activity,

## 2022-08-11 NOTE — IMAGING
[de-identified] : \par LEFT KNEE\par Inspection:  mild effusion\par Palpation: medial joint line tenderness \par Knee Range of Motion:  0-130 \par Strength: 5/5 Quadriceps strength, 5/5 Hamstring strength\par Neurological: light touch is intact throughout\par Ligament Stability and Special Tests: \par McMurrays: Positive\par Lachman: neg\par Pivot Shift: neg\par Posterior Drawer: neg\par Valgus: neg\par Varus: neg\par Patella Apprehension: neg\par Patella Maltracking: neg\par

## 2022-08-11 NOTE — DISCUSSION/SUMMARY
[de-identified] : The patient was advised of the diagnosis.  The natural history of the pathology was explained in full to the patient in layman's terms.  Several different treatment options were discussed and explained to the patient including the risks and benefits of both surgical and non-surgical treatments.\par \par Specifically, it was discussed that the patient has two pathologic conditions at this point.  There is a meniscus tear which is causing symptomatology and there is also underlying osteoarthritis.  The patient was counseled that surgical intervention to address the torn meniscus will not change the symptomatology attributable to the arthritis and therefore inferior surgical outcomes in this setting are not uncommon.  The patient was advised that the pain attributable to the meniscus tear should resolve arthroscopically.  However, the patient should expect to have continued aches and pains related to the arthritis, in the form of, but not limited to, pain, weather related changes, dull ache, crepitation, limitation of motion and strength, and the possible need for further surgeries. The patient understands that arthroscopy will only address whatever portion of their symptoms are coming from the meniscal pathology and that after surgery, the knee will not be 100%. The patient also is aware that further treatment after arthroscopy may be necessary in the form of oral medications, cortisone and/or viscosupplementation injections, and further surgeries including knee replacement.  I also reviewed with the patient that any anterior knee pain may paradoxically worsen for the first 6 weeks following arthroscopy due to quadriceps weakness. The risk of recurrent tears as well as progression of underlying arthritis, avascular necrosis, and / or chondrolysis were discussed as well. The patient demonstrated a full understanding and would like to proceed with left knee surgical arthroscopy with partial medial meniscectomy.\par \par The risks of surgery were outlined in full to the patient including but not limited to pain, infection (superficial or deep), bleeding, vascular injury, numbness, tingling, nerve damage (direct or indirect), scarring, non-healing, wound breakdown, failure to resolve symptoms, symptom recurrence, the need for further surgery, as well as medical complications such as blood clots, pulmonary embolism, heart attack, stroke, and other anesthesia complications including even death.  The patient understood and accepted these risks and that other complications not mentioned above could occur.    \par The intraoperative plan, post-operative plan, post-operative expectations and limitations were explained in full.  Expectations from non-surgical treatment were explained in full as well.  The patient demonstrated a complete understanding of the treatment alternatives and requested to proceed with surgery.  This will be scheduled accordingly.\par

## 2022-08-22 ENCOUNTER — FORM ENCOUNTER (OUTPATIENT)
Age: 62
End: 2022-08-22

## 2022-10-03 ENCOUNTER — LABORATORY RESULT (OUTPATIENT)
Age: 62
End: 2022-10-03

## 2022-10-06 RX ORDER — PROMETHAZINE HYDROCHLORIDE 12.5 MG/1
12.5 TABLET ORAL EVERY 6 HOURS
Qty: 20 | Refills: 0 | Status: ACTIVE | COMMUNITY
Start: 2022-10-06 | End: 1900-01-01

## 2022-10-06 RX ORDER — IBUPROFEN 600 MG/1
600 TABLET, FILM COATED ORAL EVERY 6 HOURS
Qty: 20 | Refills: 0 | Status: ACTIVE | COMMUNITY
Start: 2022-10-06 | End: 1900-01-01

## 2022-10-06 RX ORDER — ACETAMINOPHEN 500 MG/1
500 TABLET ORAL 3 TIMES DAILY
Qty: 90 | Refills: 0 | Status: ACTIVE | COMMUNITY
Start: 2022-10-06 | End: 1900-01-01

## 2022-10-06 RX ORDER — OXYCODONE 5 MG/1
5 TABLET ORAL EVERY 6 HOURS
Qty: 10 | Refills: 0 | Status: ACTIVE | COMMUNITY
Start: 2022-10-06 | End: 1900-01-01

## 2022-10-07 ENCOUNTER — APPOINTMENT (OUTPATIENT)
Dept: ORTHOPEDIC SURGERY | Facility: AMBULATORY SURGERY CENTER | Age: 62
End: 2022-10-07

## 2022-10-07 PROCEDURE — 29881 ARTHRS KNE SRG MNISECTMY M/L: CPT | Mod: AS,LT

## 2022-10-07 PROCEDURE — 29875 ARTHRS KNEE SURG SYNVCT LMTD: CPT | Mod: 59,LT

## 2022-10-07 PROCEDURE — 29875 ARTHRS KNEE SURG SYNVCT LMTD: CPT | Mod: AS,59,LT

## 2022-10-07 PROCEDURE — 29881 ARTHRS KNE SRG MNISECTMY M/L: CPT | Mod: LT

## 2022-10-10 ENCOUNTER — RX RENEWAL (OUTPATIENT)
Age: 62
End: 2022-10-10

## 2022-10-13 ENCOUNTER — APPOINTMENT (OUTPATIENT)
Dept: ORTHOPEDIC SURGERY | Facility: CLINIC | Age: 62
End: 2022-10-13

## 2022-10-13 PROCEDURE — 99024 POSTOP FOLLOW-UP VISIT: CPT

## 2022-10-13 NOTE — IMAGING
[de-identified] : \par  LEFT KNEE\par Inspection:  mild effusion, incisions c/d/i\par Palpation: medial facet ttp\par Knee Range of Motion:  0-120\par Strength: 4/5 Quadriceps strength, 5/5 Hamstring strength\par Neurological: light touch is intact throughout\par Ligament Stability and Special Tests: \par McMurrays: neg\par Lachman: neg\par Pivot Shift: neg\par Posterior Drawer: neg\par Valgus: neg\par Varus: neg\par Patella Apprehension: neg\par Patella Maltracking: neg\par

## 2022-10-13 NOTE — ASSESSMENT
[FreeTextEntry1] : s/p L knee PMM, synov on 10/7/22\par **has OA as well**\par \par - PT on post op protocol\par - The patient was provided with a prescription for Physical Therapy \par - Home exercises program learned at physical therapy.\par - The patient was advised to apply ice (wrapped in a towel or protective covering) to the area daily (20 minutes at a time, 2-4X/day).\par - fu 8 week re-eval\par

## 2022-10-13 NOTE — HISTORY OF PRESENT ILLNESS
[de-identified] : 62 year old male ( LHD, retired) left knee pain since twisted injury swinging golf club on 6/12/22.  Since then pain medial and deep worse with activity and better at rest. assoc with catching and buckling.  went to  saw CARMEN Cabrera and got brace and sent for PT.  Has also been icing and taking Meloxicam with mild relief.\par \par PMHx lung ca with mets to brain in remission on immunotherapy\par \par 8/11/22 - had CSI (7/28) with mild relief still medial pan and catching and locking worse with activity\par \par ***s/p L knee PMM, synov on 10/7/22  (**has OA as well**)**\par \par 10/13/22 - po visit , pain well controlled

## 2022-11-11 ENCOUNTER — APPOINTMENT (OUTPATIENT)
Dept: CT IMAGING | Facility: HOSPITAL | Age: 62
End: 2022-11-11

## 2022-11-11 ENCOUNTER — OUTPATIENT (OUTPATIENT)
Dept: OUTPATIENT SERVICES | Facility: HOSPITAL | Age: 62
LOS: 1 days | End: 2022-11-11
Payer: COMMERCIAL

## 2022-11-11 LAB — POCT ISTAT CREATININE: 1 MG/DL — SIGNIFICANT CHANGE UP (ref 0.5–1.3)

## 2022-11-11 PROCEDURE — 82565 ASSAY OF CREATININE: CPT

## 2022-11-11 PROCEDURE — 70460 CT HEAD/BRAIN W/DYE: CPT | Mod: 26

## 2022-11-11 PROCEDURE — 71260 CT THORAX DX C+: CPT | Mod: 26

## 2022-11-11 PROCEDURE — 70460 CT HEAD/BRAIN W/DYE: CPT

## 2022-11-11 PROCEDURE — 74177 CT ABD & PELVIS W/CONTRAST: CPT

## 2022-11-11 PROCEDURE — 71260 CT THORAX DX C+: CPT

## 2022-11-11 PROCEDURE — 74177 CT ABD & PELVIS W/CONTRAST: CPT | Mod: 26

## 2022-11-19 PROBLEM — C79.31 METASTATIC ADENOCARCINOMA TO BRAIN: Status: ACTIVE | Noted: 2022-11-19

## 2022-11-21 ENCOUNTER — APPOINTMENT (OUTPATIENT)
Dept: NEUROSURGERY | Facility: CLINIC | Age: 62
End: 2022-11-21

## 2022-11-21 ENCOUNTER — NON-APPOINTMENT (OUTPATIENT)
Age: 62
End: 2022-11-21

## 2022-11-21 VITALS
RESPIRATION RATE: 18 BRPM | WEIGHT: 152 LBS | SYSTOLIC BLOOD PRESSURE: 151 MMHG | OXYGEN SATURATION: 99 % | TEMPERATURE: 97 F | DIASTOLIC BLOOD PRESSURE: 88 MMHG | HEART RATE: 89 BPM | BODY MASS INDEX: 24.43 KG/M2 | HEIGHT: 66 IN

## 2022-11-21 DIAGNOSIS — C79.31 SECONDARY MALIGNANT NEOPLASM OF BRAIN: ICD-10-CM

## 2022-11-21 PROCEDURE — 99204 OFFICE O/P NEW MOD 45 MIN: CPT

## 2022-11-21 RX ORDER — ROSUVASTATIN CALCIUM 10 MG/1
10 TABLET, FILM COATED ORAL
Refills: 0 | Status: ACTIVE | COMMUNITY

## 2022-11-21 NOTE — REVIEW OF SYSTEMS
[As Noted in HPI] : as noted in HPI [Fever] : no fever [Chills] : no chills [Confused or Disoriented] : no confusion [Facial Weakness] : no facial weakness [Arm Weakness] : no arm weakness [Hand Weakness] : no hand weakness [Leg Weakness] : no leg weakness [Seizures] : no convulsions [Dizziness] : no dizziness [Eyesight Problems] : no eyesight problems [Chest Pain] : no chest pain [Palpitations] : no palpitations [Shortness Of Breath] : no shortness of breath

## 2022-11-21 NOTE — REASON FOR VISIT
[New Patient Visit] : a new patient visit [Spouse] : spouse [FreeTextEntry1] : metastatic lung adenocarcinoma with new brain lesions

## 2022-11-21 NOTE — DATA REVIEWED
[de-identified] : \par \par \par ACC: [id number] EXAM: CT CHEST OC IC\par ACC: [id number] EXAM: CT ABDOMEN AND PELVIS OC IC\par \par PROCEDURE DATE: 11/11/2022\par \par \par \par INTERPRETATION: CLINICAL INFORMATION: Lung cancer\par \par COMPARISON: 3/3/2022\par \par CONTRAST/COMPLICATIONS:\par IV Contrast: Isovue 370 (accession [id number]), IV contrast documented in associated exam (accession [id number]) 100 cc administered 0 cc discarded\par Oral Contrast: NONE\par Complications: None reported at time of study completion\par \par PROCEDURE:\par CT of the Chest, Abdomen and Pelvis was performed.\par Sagittal and coronal reformats were performed.\par \par FINDINGS:\par CHEST:\par LUNGS AND LARGE AIRWAYS: Patent central airways. Status post right lower lobectomy with stable postsurgical and post radiation changes.\par PLEURA: No pleural effusion.\par VESSELS: Within normal limits.\par HEART: Heart size is normal. No pericardial effusion.\par MEDIASTINUM AND MAYRA: No significant lymphadenopathy.\par CHEST WALL AND LOWER NECK: Within normal limits.\par \par ABDOMEN AND PELVIS:\par LIVER: Several stable cysts.\par BILE DUCTS: Normal caliber.\par GALLBLADDER: Within normal limits.\par SPLEEN: Within normal limits.\par PANCREAS: Within normal limits.\par ADRENALS: Within normal limits.\par KIDNEYS/URETERS: 3.3 cm left renal cyst.\par \par BLADDER: Distended, correlate for bladder outlet obstruction/urinary tract.\par REPRODUCTIVE ORGANS: Prostate is enlarged.\par \par BOWEL: No bowel obstruction.\par PERITONEUM: No ascites.\par VESSELS: Within normal limits.\par RETROPERITONEUM/LYMPH NODES: No significant lymphadenopathy. 6 mm gastrohepatic lymph node, decreased from 8 mm.\par ABDOMINAL WALL: Within normal limits.\par BONES: Within normal limits.\par \par IMPRESSION:\par No locally recurrent or metastatic disease.\par \par \par \par --- End of Report ---\par \par \par \par ACC: [id number] EXAM: CT BRAIN IC\par \par PROCEDURE DATE: 11/11/2022\par \par \par \par INTERPRETATION: PROCEDURE: CT head with intravenous contrast\par \par INDICATION: Lung cancer. Evaluate for metastasis.\par \par TECHNIQUE: Multiple axial images were obtained at 5 mm intervals from the skull base to the vertex following the administration of 100 cc Isovue 370. Sagittal and coronal reformatted images were obtained from the axial data set. The images were reviewed in brain and bone windows.\par \par COMPARISON: CT head from 7/27/2021 and 2/6/2021\par \par FINDINGS:\par VENTRICLES AND SULCI: No hydrocephalus.\par INTRA-AXIAL: New 0.7 x 0.9 x 0.7 cm (tv x ap x cc) enhancing nodular lesion in the left occipital lobe subcortical white matter with small surrounding vasogenic edema (3:17, 6:27). There is a second enhancing nodular lesion at the gray-white junction in the posterior left temporal lobe measuring 0.4 x 0.3 x 0.4 cm without adjacent edema. No acute hemorrhage or midline shift is present. No acute transcortical infarction.\par EXTRA-AXIAL: Left paramedian posterior fossa arachnoid cyst, unchanged.\par VISUALIZED SINUSES: No air-fluid levels are identified.\par VISUALIZED MASTOIDS: Clear.\par CALVARIUM: Normal.\par \par A radiologist clinical note was sent by secure email to the referring provider.\par \par IMPRESSION: Interval development of 2 metastases within the left temporal and occipital lobes.\par \par --- End of Report ---\par \par \par

## 2022-11-21 NOTE — HISTORY OF PRESENT ILLNESS
[de-identified] : 63 y/o left- handed male with PMHx of testicular cancer, also metastatic lung adenocarcinoma (dx 2009) who presents today for evaluation for new brain mets. \par Hx of radiation to lung and lymph nodes. \par \par Patient endorses about 4 yrs ago also with brain lesions- never underwent intervention- resolved with treatment change.\par He is now on his 3rd treatment line, Lorbrena. \par CTH done 11/11/22 with interval development of 2 metastases within the left temporal and occipital lobes. Pt states last CT with 6 months ago. \par \par Today pt presents for evaluation. \par He endorses had CT instead of MRI s/t anxiety with scan. \par \par Denies headaches, dizziness, seizure activity, visual changes, or new/worsening focal neuro deficits. \par Denies upper/lower extremity weakness, numbness/tingling, bowel/ bladder issues, or new/worsening focal neuro deficits. \par \par Oncologist: Dr. Nissa Luna (previously Dr. Ku)\par

## 2022-11-21 NOTE — ASSESSMENT
[FreeTextEntry1] : 62M with ALK+ lung ca on oral treatment for the past 13 years who had previous brain mets that improved with oral tx who now has 2 new asymptomatic subcentimeter lesions on a recent CT with contrast. We discussed the role of stereotactic radiosurgery for the treatment of the newly diagnosed brain disease to effect growth of the tumors. I have recommended he see the radiation oncology team at [facility] to schedule treatment. \par \par Dr. D' Amico independently reviewed all available images with patient. \par \par PLAN: \par - Referral to radiation oncologist \par - Will plan for MRI pre GKRS, per Dr. Flowers office\par \par Patient verbalizes understanding of today’s discussion and next steps in treatment plan. \par \par  \par A total of 45 minutes was spent reviewing the labs, imaging and physical examination of the patient. We discussed the diagnosis, and the plan. The patient's questions were answered. The patient demonstrated an excellent understanding of the plan.

## 2022-11-21 NOTE — PHYSICAL EXAM
[General Appearance - Alert] : alert [General Appearance - In No Acute Distress] : in no acute distress [Oriented To Time, Place, And Person] : oriented to person, place, and time [Impaired Insight] : insight and judgment were intact [Affect] : the affect was normal [Memory Recent] : recent memory was not impaired [Sclera] : the sclera and conjunctiva were normal [PERRL With Normal Accommodation] : pupils were equal in size, round, reactive to light, with normal accommodation [Hearing Threshold Finger Rub Not Centre] : hearing was normal [Neck Appearance] : the appearance of the neck was normal [] : no respiratory distress [Respiration, Rhythm And Depth] : normal respiratory rhythm and effort [Abnormal Walk] : normal gait [Skin Color & Pigmentation] : normal skin color and pigmentation [Person] : oriented to person [Place] : oriented to place [Time] : oriented to time [Short Term Intact] : short term memory intact [Cranial Nerves Optic (II)] : visual acuity intact bilaterally,  pupils equal round and reactive to light [Cranial Nerves Oculomotor (III)] : extraocular motion intact [Cranial Nerves Trigeminal (V)] : facial sensation intact symmetrically [Cranial Nerves Facial (VII)] : face symmetrical [Cranial Nerves Vestibulocochlear (VIII)] : hearing was intact bilaterally [Cranial Nerves Glossopharyngeal (IX)] : tongue and palate midline [Cranial Nerves Accessory (XI - Cranial And Spinal)] : head turning and shoulder shrug symmetric [Cranial Nerves Hypoglossal (XII)] : there was no tongue deviation with protrusion [Motor Tone] : muscle tone was normal in all four extremities [Motor Strength] : muscle strength was normal in all four extremities [Motor Handedness Left-Handed] : the patient is left hand dominant [Sensation Tactile Decrease] : light touch was intact

## 2022-11-28 ENCOUNTER — APPOINTMENT (OUTPATIENT)
Dept: RADIATION ONCOLOGY | Facility: CLINIC | Age: 62
End: 2022-11-28

## 2022-11-28 PROCEDURE — 99204 OFFICE O/P NEW MOD 45 MIN: CPT | Mod: 25,95

## 2022-11-28 RX ORDER — LORLATINIB 100 MG/1
TABLET, FILM COATED ORAL
Refills: 0 | Status: ACTIVE | COMMUNITY

## 2022-11-28 NOTE — HISTORY OF PRESENT ILLNESS
[Home] : at home, [unfilled] , at the time of the visit. [Medical Office: ([facility])___] : at the medical office located in  [FreeTextEntry1] : Mr Lowe is a 62 year old male with PMH of testicular cancer s/p orchiectomy (seminoma stage I)  (2010), lung adenocarcinoma (Alk mutant) diagnosed in 2009 s/p resection followed by CRT with subsequent metastatic spread currently on targeted therapy with Lorbrena (lorlatinib). Had complete resolution of brain mets initially with systemic therapy, with subsequent metastatic recurrence. He now presents with new findings of asymptomatic brain mets within the left temporal and occipital lobes.\par \par He was diagnosed with RLL Lung Cancer while undergoing surveillance work up for history of testicular cancer.\par S/p Right lung lobectomy in 2009. He completed chemotherapy in 2010, received radiation therapy x2 for lymph nodes metastases  Approximately 6 years ago he developed brain mets in the setting of metastatic disease recurrence, though these resolved with targeted systemic therapy alone. He continues to follow with Dr. Ponce at [facility], and remains on Lorbrena (lorlatinib).\par \par The imaging available to us is as follows:\par \par 7/28/2021 CT Head [facility] IMPRESSION: No abnormal intracranial enhancement seen on CT, again without evidence of metastatic disease.\par \par \par 8/18/2021 PET/CT Impression: Since 5/17/2017,\par 1.  New FDG-avid soft tissue nodule inferior to the right lower lobectomy surgical suture, suspicious for local recurrence versus metastatic lymphadenopathy.\par 2.  New FDG-avid paraesophageal node and gastrohepatic soft tissue/lymph node, suspicious for metastases.\par 3.  Mildly FDG-avid right supraclavicular (station 1R) node, indeterminate.\par \par \par 11/11/2022 CT Head [facility] \par FINDINGS:\par VENTRICLES AND SULCI: No hydrocephalus.\par INTRA-AXIAL: New 0.7 x 0.9 x 0.7 cm (tv x ap x cc) enhancing nodular lesion in the left occipital lobe subcortical white matter with small surrounding vasogenic edema (3:17, 6:27). There is a second enhancing nodular lesion at the gray-white junction in the posterior left temporal lobe measuring 0.4 x 0.3 x 0.4 cm without adjacent edema. No acute hemorrhage or midline shift is present. No acute transcortical infarction.EXTRA-AXIAL: Left paramedian posterior fossa arachnoid cyst, unchanged.\par VISUALIZED SINUSES: No air-fluid levels are identified.VISUALIZED MASTOIDS: Clear.CALVARIUM:  Normal.\par IMPRESSION: Interval development of 2 metastases within the left temporal and occipital lobes.\par \par 11/21/2022 Established care with Dr Damico, who discussed GK SRS with the patient and referred him to our office for further discussion.\par \par He has been monitored with CT of the head during the course of his illness, MRIs not preferred due to anxiety. He is able to undergo MRI with premedication(Valium)\par \par [facility] Dr[facility] Currently on Lorbrena. Tolerating well. \par Reports Neuropathy to fingers. Denies other symptoms.\par \par  \par \par

## 2022-11-28 NOTE — VITALS
[Maximal Pain Intensity: 0/10] : 0/10 [Least Pain Intensity: 0/10] : 0/10 [90: Able to carry normal activity; minor signs or symptoms of disease.] : 90: Able to carry normal activity; minor signs or symptoms of disease.  [Date: ____________] : Patient's last distress assessment performed on [unfilled]. [5 - Distress Level] : Distress Level: 5

## 2022-11-28 NOTE — PHYSICAL EXAM
[General Appearance - Well Developed] : well developed [Oriented To Time, Place, And Person] : oriented to person, place, and time

## 2022-11-28 NOTE — REVIEW OF SYSTEMS
[Negative] : Allergic/Immunologic [FreeTextEntry8] : urinary retention, on Flomax [FreeTextEntry9] : s/p left knee meniscectomy

## 2022-11-29 DIAGNOSIS — F41.8 OTHER SPECIFIED ANXIETY DISORDERS: ICD-10-CM

## 2022-12-01 ENCOUNTER — OUTPATIENT (OUTPATIENT)
Dept: OUTPATIENT SERVICES | Facility: HOSPITAL | Age: 62
LOS: 1 days | Discharge: HOME | End: 2022-12-01

## 2022-12-01 DIAGNOSIS — C79.31 SECONDARY MALIGNANT NEOPLASM OF BRAIN: ICD-10-CM

## 2022-12-01 PROCEDURE — 77263 THER RADIOLOGY TX PLNG CPLX: CPT

## 2022-12-05 ENCOUNTER — TRANSCRIPTION ENCOUNTER (OUTPATIENT)
Age: 62
End: 2022-12-05

## 2022-12-08 ENCOUNTER — APPOINTMENT (OUTPATIENT)
Dept: ORTHOPEDIC SURGERY | Facility: CLINIC | Age: 62
End: 2022-12-08

## 2022-12-12 ENCOUNTER — RX RENEWAL (OUTPATIENT)
Age: 62
End: 2022-12-12

## 2022-12-12 RX ORDER — MELOXICAM 15 MG/1
15 TABLET ORAL DAILY
Qty: 30 | Refills: 0 | Status: ACTIVE | COMMUNITY
Start: 2022-06-12 | End: 1900-01-01

## 2022-12-19 ENCOUNTER — OUTPATIENT (OUTPATIENT)
Dept: OUTPATIENT SERVICES | Facility: HOSPITAL | Age: 62
LOS: 1 days | End: 2022-12-19
Payer: COMMERCIAL

## 2022-12-19 ENCOUNTER — APPOINTMENT (OUTPATIENT)
Dept: MRI IMAGING | Facility: IMAGING CENTER | Age: 62
End: 2022-12-19

## 2022-12-19 DIAGNOSIS — C79.31 SECONDARY MALIGNANT NEOPLASM OF BRAIN: ICD-10-CM

## 2022-12-19 PROCEDURE — 77334 RADIATION TREATMENT AID(S): CPT | Mod: 26

## 2022-12-19 PROCEDURE — A9585: CPT

## 2022-12-19 PROCEDURE — 76498 UNLISTED MR PROCEDURE: CPT

## 2022-12-19 PROCEDURE — 77290 THER RAD SIMULAJ FIELD CPLX: CPT | Mod: 26

## 2022-12-19 RX ORDER — DIAZEPAM 5 MG/1
5 TABLET ORAL
Qty: 2 | Refills: 0 | Status: ACTIVE | COMMUNITY
Start: 2022-12-19 | End: 1900-01-01

## 2022-12-20 PROCEDURE — 77295 3-D RADIOTHERAPY PLAN: CPT | Mod: 26

## 2022-12-20 PROCEDURE — 77300 RADIATION THERAPY DOSE PLAN: CPT | Mod: 26

## 2022-12-21 ENCOUNTER — APPOINTMENT (OUTPATIENT)
Dept: NEUROSURGERY | Facility: IMAGING CENTER | Age: 62
End: 2022-12-21

## 2022-12-21 PROCEDURE — 61797 SRS CRAN LES SIMPLE ADDL: CPT

## 2022-12-21 PROCEDURE — 61796 SRS CRANIAL LESION SIMPLE: CPT

## 2022-12-21 PROCEDURE — 77432 STEREOTACTIC RADIATION TRMT: CPT

## 2022-12-28 ENCOUNTER — NON-APPOINTMENT (OUTPATIENT)
Age: 62
End: 2022-12-28

## 2022-12-29 ENCOUNTER — APPOINTMENT (OUTPATIENT)
Dept: ORTHOPEDIC SURGERY | Facility: CLINIC | Age: 62
End: 2022-12-29

## 2022-12-30 ENCOUNTER — RX RENEWAL (OUTPATIENT)
Age: 62
End: 2022-12-30

## 2022-12-30 DIAGNOSIS — C79.31 SECONDARY MALIGNANT NEOPLASM OF BRAIN: ICD-10-CM

## 2023-01-04 PROBLEM — S83.232D COMPLEX TEAR OF MEDIAL MENISCUS OF LEFT KNEE AS CURRENT INJURY, SUBSEQUENT ENCOUNTER: Status: ACTIVE | Noted: 2022-08-10

## 2023-01-04 PROBLEM — M17.12 ARTHRITIS OF KNEE, LEFT: Status: ACTIVE | Noted: 2022-08-10

## 2023-01-04 PROBLEM — M23.92 INTERNAL DERANGEMENT OF LEFT KNEE: Status: RESOLVED | Noted: 2022-06-12 | Resolved: 2023-01-04

## 2023-01-05 ENCOUNTER — APPOINTMENT (OUTPATIENT)
Dept: ORTHOPEDIC SURGERY | Facility: CLINIC | Age: 63
End: 2023-01-05
Payer: COMMERCIAL

## 2023-01-05 VITALS — BODY MASS INDEX: 24.43 KG/M2 | HEIGHT: 66 IN | WEIGHT: 152 LBS

## 2023-01-05 DIAGNOSIS — S83.232D COMPLEX TEAR OF MEDIAL MENISCUS, CURRENT INJURY, LEFT KNEE, SUBSEQUENT ENCOUNTER: ICD-10-CM

## 2023-01-05 DIAGNOSIS — M17.12 UNILATERAL PRIMARY OSTEOARTHRITIS, LEFT KNEE: ICD-10-CM

## 2023-01-05 DIAGNOSIS — M23.92 UNSPECIFIED INTERNAL DERANGEMENT OF LEFT KNEE: ICD-10-CM

## 2023-01-05 PROCEDURE — J3490M: CUSTOM

## 2023-01-05 PROCEDURE — 20610 DRAIN/INJ JOINT/BURSA W/O US: CPT | Mod: 58,LT

## 2023-01-05 PROCEDURE — 99214 OFFICE O/P EST MOD 30 MIN: CPT | Mod: 25

## 2023-01-05 NOTE — HISTORY OF PRESENT ILLNESS
[de-identified] : 62 year old male ( LHD, retired) left knee pain since twisted injury swinging golf club on 6/12/22.  Since then pain medial and deep worse with activity and better at rest. assoc with catching and buckling.  went to  saw CARMEN Cabrera and got brace and sent for PT.  Has also been icing and taking Meloxicam with mild relief.\par \par PMHx lung ca with mets to brain in remission on immunotherapy\par \par 8/11/22 - had CSI (7/28) with mild relief still medial pan and catching and locking worse with activity\par \par ***s/p L knee PMM, synov on 10/7/22  (**has OA as well**)**\par \par 10/13/22 - po visit , pain well controlled\par 1/5/23 - doing PT on protocol along with HEP , some pain in front of knee, he had to stop PT due to other medical issues

## 2023-01-05 NOTE — ASSESSMENT
[FreeTextEntry1] : s/p L knee PMM, synov on 10/7/22\par **has OA as well**\par \par OA exacb\par \par - We discussed their diagnosis and treatment options at length including the risks and benefits of both surgical and non-surgical options.\par - We will continue conservative treatment with activity modification, PT, icing, weight loss, and anti-inflammatory medications.\par - The patient was provided with a PT prescription to work on ROM, hip ER/abductors strengthening, quad/hamstring stretches and strengthening, and other exercises \par - The patient was advised to let pain guide the gradual advancement of activities. \par - We also discussed the possible of a corticosteroid injection in order to help decrease inflammation and pain so that they can perform better therapy.\par - The risks, benefits, and alternatives to corticosteroid injection were reviewed with the patient and they wished to proceed with this treatment course. \par - Follow up as needed in 6 weeks to re-evaluate, if no improvement we spoke about possibility of viscosupplementation injections\par

## 2023-01-05 NOTE — IMAGING
[de-identified] : \par LEFT KNEE\par Inspection:  well healed surg scars, mild effusion\par Palpation:  ttp medial facet\par Knee Range of Motion:  0-135\par Strength: 5-/5 Quadriceps strength, 5/5 Hamstring strength\par Neurological: light touch is intact throughout\par Ligament Stability and Special Tests: \par McMurrays: neg\par Lachman: neg\par Pivot Shift: neg\par Posterior Drawer: neg\par Valgus: neg\par Varus: neg\par Patella Apprehension: neg\par Patella Maltracking: neg\par

## 2023-01-19 ENCOUNTER — APPOINTMENT (OUTPATIENT)
Dept: UROLOGY | Facility: CLINIC | Age: 63
End: 2023-01-19
Payer: COMMERCIAL

## 2023-01-19 VITALS
HEART RATE: 109 BPM | OXYGEN SATURATION: 99 % | TEMPERATURE: 97.3 F | SYSTOLIC BLOOD PRESSURE: 143 MMHG | DIASTOLIC BLOOD PRESSURE: 86 MMHG

## 2023-01-19 LAB — PSA SERPL-MCNC: 3.73 NG/ML

## 2023-01-19 PROCEDURE — 51798 US URINE CAPACITY MEASURE: CPT

## 2023-01-19 PROCEDURE — 99214 OFFICE O/P EST MOD 30 MIN: CPT | Mod: 25

## 2023-01-19 NOTE — PHYSICAL EXAM
[General Appearance - Well Developed] : well developed [General Appearance - Well Nourished] : well nourished [Normal Appearance] : normal appearance [Well Groomed] : well groomed [General Appearance - In No Acute Distress] : no acute distress [Abdomen Soft] : soft [Abdomen Tenderness] : non-tender [Costovertebral Angle Tenderness] : no ~M costovertebral angle tenderness [Urethral Meatus] : meatus normal [Urinary Bladder Findings] : the bladder was normal on palpation [Scrotum] : the scrotum was normal [Testes Mass (___cm)] : there were no testicular masses [No Prostate Nodules] : no prostate nodules [Edema] : no peripheral edema [] : no respiratory distress [Respiration, Rhythm And Depth] : normal respiratory rhythm and effort [Exaggerated Use Of Accessory Muscles For Inspiration] : no accessory muscle use [Oriented To Time, Place, And Person] : oriented to person, place, and time [Affect] : the affect was normal [Mood] : the mood was normal [Not Anxious] : not anxious [No Focal Deficits] : no focal deficits [No Palpable Adenopathy] : no palpable adenopathy

## 2023-01-19 NOTE — ASSESSMENT
[FreeTextEntry1] : Diagnosis: Bladder outlet obstruction, BPH, retention\par \par Plan: \par Discussed surgical options (TURP, HoLEP or Rezum, Greenlight) vs. watchful waiting vs. tamsulosin.\par \par Risks of retention, UTI, stones, obstructive nephropathy, bladder failure, CIC/SPT/Christopher dependency\par \par These risks were weighed against risks of procedures, and discussed/related in the context of his metastatic disease and life expectancy. \par \par \par Kenji Sam MD, FACS, FRCS \par  of Urology [facility]\par Director of Laparoscopic and Robotic Surgery \par [facility] Director of Urology, [facility] \par Professor of Urology\par \par (Office) [phone]\par (Cell)  [phone] \par [email or web address] \par \par

## 2023-01-19 NOTE — HISTORY OF PRESENT ILLNESS
[FreeTextEntry1] : CC: Elevated PSA, LUTS, urinary retention \par \par Patient with metastatic lung cancer to the brain and right supraclavicular lymph node\par He recently underwent gamma radiation to the brain, completed 12/2022 \par \par \par Testicular cancer s/p orchiectomy (seminoma stage I) and right lung lobectomy. \par \par PSA 1/2023 3.73 ng/ml \par PSA 4/1/21 5.35  ng/ml  \par \par In past, PVRs have ranged from 285-500, today is ~500 cc. \par He continues with tamsulosin 0.4 mg; he finds it hard to say if alleviating symptoms, as he has taken it for a long time, and he has almost no urinary symptoms.  Nocturia is 1x, mild severity.  No pushing or straining.  \par \par Stream stable, sometimes "stronger than others, and sometimes a few blasts, but I pee, don't strain, doesn't hurt" \par \par US: 55 cc prostate \par \par UDS with Dr. Guadarrama demonstrated HARGROVE, with elevated voiding pressures,  cc \par \par \par  \par Active Problems\par Elevated prostate specific antigen (PSA) (790.93) (R97.20)\par Mediastinal lymphadenopathy (785.6) (R59.0)\par Urinary retention due to benign prostatic hyperplasia (600.91,788.20) (N40.1,R33.8)\par \par Past Medical History\par History of Cough (786.2) (R05)\par History of Malignant neoplasm of lower lobe of right lung (162.5) (C34.31)\par History of Testicular neoplasm (239.5) (D49.59)\par \par Current Meds\par Tamsulosin HCl - 0.4 MG Oral Capsule; TAKE 1 CAPSULE Daily\par \par Allergies\par No Known Allergies\par

## 2023-02-01 ENCOUNTER — APPOINTMENT (OUTPATIENT)
Dept: UROLOGY | Facility: CLINIC | Age: 63
End: 2023-02-01

## 2023-02-20 ENCOUNTER — APPOINTMENT (OUTPATIENT)
Dept: MRI IMAGING | Facility: CLINIC | Age: 63
End: 2023-02-20
Payer: COMMERCIAL

## 2023-02-20 ENCOUNTER — OUTPATIENT (OUTPATIENT)
Dept: OUTPATIENT SERVICES | Facility: HOSPITAL | Age: 63
LOS: 1 days | End: 2023-02-20
Payer: COMMERCIAL

## 2023-02-20 DIAGNOSIS — C79.31 SECONDARY MALIGNANT NEOPLASM OF BRAIN: ICD-10-CM

## 2023-02-20 PROCEDURE — A9585: CPT

## 2023-02-20 PROCEDURE — 70553 MRI BRAIN STEM W/O & W/DYE: CPT

## 2023-02-20 PROCEDURE — 70553 MRI BRAIN STEM W/O & W/DYE: CPT | Mod: 26

## 2023-02-24 ENCOUNTER — APPOINTMENT (OUTPATIENT)
Dept: RADIATION ONCOLOGY | Facility: CLINIC | Age: 63
End: 2023-02-24
Payer: COMMERCIAL

## 2023-02-24 PROCEDURE — 99024 POSTOP FOLLOW-UP VISIT: CPT

## 2023-02-24 NOTE — REASON FOR VISIT
[Post-Treatment Evaluation] : post-treatment evaluation for [Brain Metastasis] : brain metastasis [Spouse] : spouse

## 2023-03-01 ENCOUNTER — APPOINTMENT (OUTPATIENT)
Dept: RADIATION ONCOLOGY | Facility: CLINIC | Age: 63
End: 2023-03-01

## 2023-03-05 NOTE — VITALS
[Maximal Pain Intensity: 0/10] : 0/10 [Least Pain Intensity: 0/10] : 0/10 [90: Able to carry normal activity; minor signs or symptoms of disease.] : 90: Able to carry normal activity; minor signs or symptoms of disease.  [Date: ____________] : Patient's last distress assessment performed on [unfilled]. [5 - Distress Level] : Distress Level: 5 [ECOG Performance Status: 1 - Restricted in physically strenuous activity but ambulatory and able to carry out work of a light or sedentary nature] : Performance Status: 1 - Restricted in physically strenuous activity but ambulatory and able to carry out work of a light or sedentary nature, e.g., light house work, office work

## 2023-04-24 ENCOUNTER — APPOINTMENT (OUTPATIENT)
Dept: MAMMOGRAPHY | Facility: CLINIC | Age: 63
End: 2023-04-24
Payer: COMMERCIAL

## 2023-04-24 ENCOUNTER — OUTPATIENT (OUTPATIENT)
Dept: OUTPATIENT SERVICES | Facility: HOSPITAL | Age: 63
LOS: 1 days | End: 2023-04-24
Payer: COMMERCIAL

## 2023-04-24 ENCOUNTER — APPOINTMENT (OUTPATIENT)
Dept: ULTRASOUND IMAGING | Facility: CLINIC | Age: 63
End: 2023-04-24
Payer: COMMERCIAL

## 2023-04-24 DIAGNOSIS — Z00.8 ENCOUNTER FOR OTHER GENERAL EXAMINATION: ICD-10-CM

## 2023-04-24 PROCEDURE — 76641 ULTRASOUND BREAST COMPLETE: CPT | Mod: 26,50

## 2023-04-24 PROCEDURE — 76641 ULTRASOUND BREAST COMPLETE: CPT

## 2023-04-24 PROCEDURE — G0279: CPT | Mod: 26

## 2023-04-24 PROCEDURE — G0279: CPT

## 2023-04-24 PROCEDURE — 77066 DX MAMMO INCL CAD BI: CPT

## 2023-04-24 PROCEDURE — 77066 DX MAMMO INCL CAD BI: CPT | Mod: 26

## 2023-05-18 ENCOUNTER — APPOINTMENT (OUTPATIENT)
Dept: CT IMAGING | Facility: IMAGING CENTER | Age: 63
End: 2023-05-18
Payer: COMMERCIAL

## 2023-05-18 ENCOUNTER — OUTPATIENT (OUTPATIENT)
Dept: OUTPATIENT SERVICES | Facility: HOSPITAL | Age: 63
LOS: 1 days | End: 2023-05-18
Payer: COMMERCIAL

## 2023-05-18 DIAGNOSIS — C79.31 SECONDARY MALIGNANT NEOPLASM OF BRAIN: ICD-10-CM

## 2023-05-18 DIAGNOSIS — Z00.8 ENCOUNTER FOR OTHER GENERAL EXAMINATION: ICD-10-CM

## 2023-05-18 DIAGNOSIS — C34.31 MALIGNANT NEOPLASM OF LOWER LOBE, RIGHT BRONCHUS OR LUNG: ICD-10-CM

## 2023-05-18 DIAGNOSIS — C77.0 SECONDARY AND UNSPECIFIED MALIGNANT NEOPLASM OF LYMPH NODES OF HEAD, FACE AND NECK: ICD-10-CM

## 2023-05-18 DIAGNOSIS — C77.1 SECONDARY AND UNSPECIFIED MALIGNANT NEOPLASM OF INTRATHORACIC LYMPH NODES: ICD-10-CM

## 2023-05-18 DIAGNOSIS — Z51.11 ENCOUNTER FOR ANTINEOPLASTIC CHEMOTHERAPY: ICD-10-CM

## 2023-05-18 PROCEDURE — 71260 CT THORAX DX C+: CPT | Mod: 26

## 2023-05-18 PROCEDURE — 71260 CT THORAX DX C+: CPT

## 2023-05-18 PROCEDURE — 74177 CT ABD & PELVIS W/CONTRAST: CPT

## 2023-05-18 PROCEDURE — 74177 CT ABD & PELVIS W/CONTRAST: CPT | Mod: 26

## 2023-06-05 ENCOUNTER — APPOINTMENT (OUTPATIENT)
Dept: UROLOGY | Facility: CLINIC | Age: 63
End: 2023-06-05

## 2023-08-03 RX ORDER — DIAZEPAM 2 MG/1
2 TABLET ORAL
Qty: 5 | Refills: 0 | Status: ACTIVE | COMMUNITY
Start: 2022-11-29 | End: 1900-01-01

## 2023-08-22 ENCOUNTER — APPOINTMENT (OUTPATIENT)
Dept: MRI IMAGING | Facility: CLINIC | Age: 63
End: 2023-08-22
Payer: COMMERCIAL

## 2023-08-22 ENCOUNTER — OUTPATIENT (OUTPATIENT)
Dept: OUTPATIENT SERVICES | Facility: HOSPITAL | Age: 63
LOS: 1 days | End: 2023-08-22
Payer: COMMERCIAL

## 2023-08-22 DIAGNOSIS — C79.31 SECONDARY MALIGNANT NEOPLASM OF BRAIN: ICD-10-CM

## 2023-08-22 PROCEDURE — 70553 MRI BRAIN STEM W/O & W/DYE: CPT | Mod: 26

## 2023-08-22 PROCEDURE — A9585: CPT

## 2023-08-22 PROCEDURE — 70553 MRI BRAIN STEM W/O & W/DYE: CPT

## 2023-09-27 ENCOUNTER — APPOINTMENT (OUTPATIENT)
Dept: MRI IMAGING | Facility: CLINIC | Age: 63
End: 2023-09-27
Payer: COMMERCIAL

## 2023-09-27 ENCOUNTER — OUTPATIENT (OUTPATIENT)
Dept: OUTPATIENT SERVICES | Facility: HOSPITAL | Age: 63
LOS: 1 days | End: 2023-09-27
Payer: COMMERCIAL

## 2023-09-27 ENCOUNTER — RESULT REVIEW (OUTPATIENT)
Age: 63
End: 2023-09-27

## 2023-09-27 DIAGNOSIS — C79.31 SECONDARY MALIGNANT NEOPLASM OF BRAIN: ICD-10-CM

## 2023-09-27 PROCEDURE — 70553 MRI BRAIN STEM W/O & W/DYE: CPT | Mod: 26

## 2023-09-27 PROCEDURE — A9585: CPT

## 2023-09-27 PROCEDURE — 70553 MRI BRAIN STEM W/O & W/DYE: CPT

## 2023-10-05 ENCOUNTER — NON-APPOINTMENT (OUTPATIENT)
Age: 63
End: 2023-10-05

## 2023-10-26 ENCOUNTER — OUTPATIENT (OUTPATIENT)
Dept: OUTPATIENT SERVICES | Facility: HOSPITAL | Age: 63
LOS: 1 days | End: 2023-10-26
Payer: COMMERCIAL

## 2023-10-26 ENCOUNTER — APPOINTMENT (OUTPATIENT)
Dept: CT IMAGING | Facility: CLINIC | Age: 63
End: 2023-10-26
Payer: COMMERCIAL

## 2023-10-26 DIAGNOSIS — Z00.00 ENCOUNTER FOR GENERAL ADULT MEDICAL EXAMINATION WITHOUT ABNORMAL FINDINGS: ICD-10-CM

## 2023-10-26 PROCEDURE — 71260 CT THORAX DX C+: CPT

## 2023-10-26 PROCEDURE — 74177 CT ABD & PELVIS W/CONTRAST: CPT

## 2023-10-26 PROCEDURE — 71260 CT THORAX DX C+: CPT | Mod: 26

## 2023-10-26 PROCEDURE — 74177 CT ABD & PELVIS W/CONTRAST: CPT | Mod: 26

## 2023-11-10 ENCOUNTER — INPATIENT (INPATIENT)
Facility: HOSPITAL | Age: 63
LOS: 5 days | Discharge: HOME | End: 2023-11-16
Attending: HOSPITALIST | Admitting: HOSPITALIST
Payer: COMMERCIAL

## 2023-11-10 VITALS
SYSTOLIC BLOOD PRESSURE: 166 MMHG | HEART RATE: 118 BPM | RESPIRATION RATE: 20 BRPM | OXYGEN SATURATION: 100 % | TEMPERATURE: 99 F | DIASTOLIC BLOOD PRESSURE: 101 MMHG

## 2023-11-10 DIAGNOSIS — E78.5 HYPERLIPIDEMIA, UNSPECIFIED: ICD-10-CM

## 2023-11-10 DIAGNOSIS — C34.90 MALIGNANT NEOPLASM OF UNSPECIFIED PART OF UNSPECIFIED BRONCHUS OR LUNG: ICD-10-CM

## 2023-11-10 DIAGNOSIS — N17.9 ACUTE KIDNEY FAILURE, UNSPECIFIED: ICD-10-CM

## 2023-11-10 DIAGNOSIS — R06.02 SHORTNESS OF BREATH: ICD-10-CM

## 2023-11-10 DIAGNOSIS — R79.89 OTHER SPECIFIED ABNORMAL FINDINGS OF BLOOD CHEMISTRY: ICD-10-CM

## 2023-11-10 DIAGNOSIS — N13.9 OBSTRUCTIVE AND REFLUX UROPATHY, UNSPECIFIED: ICD-10-CM

## 2023-11-10 DIAGNOSIS — Z29.9 ENCOUNTER FOR PROPHYLACTIC MEASURES, UNSPECIFIED: ICD-10-CM

## 2023-11-10 DIAGNOSIS — Z90.79 ACQUIRED ABSENCE OF OTHER GENITAL ORGAN(S): Chronic | ICD-10-CM

## 2023-11-10 LAB
ALBUMIN SERPL ELPH-MCNC: 4.5 G/DL — SIGNIFICANT CHANGE UP (ref 3.3–5)
ALBUMIN SERPL ELPH-MCNC: 4.5 G/DL — SIGNIFICANT CHANGE UP (ref 3.3–5)
ALP SERPL-CCNC: 68 U/L — SIGNIFICANT CHANGE UP (ref 40–120)
ALP SERPL-CCNC: 68 U/L — SIGNIFICANT CHANGE UP (ref 40–120)
ALT FLD-CCNC: 9 U/L — SIGNIFICANT CHANGE UP (ref 4–41)
ALT FLD-CCNC: 9 U/L — SIGNIFICANT CHANGE UP (ref 4–41)
ANION GAP SERPL CALC-SCNC: 11 MMOL/L — SIGNIFICANT CHANGE UP (ref 7–14)
ANION GAP SERPL CALC-SCNC: 11 MMOL/L — SIGNIFICANT CHANGE UP (ref 7–14)
ANION GAP SERPL CALC-SCNC: 13 MMOL/L — SIGNIFICANT CHANGE UP (ref 7–14)
ANION GAP SERPL CALC-SCNC: 13 MMOL/L — SIGNIFICANT CHANGE UP (ref 7–14)
APPEARANCE UR: CLEAR — SIGNIFICANT CHANGE UP
APPEARANCE UR: CLEAR — SIGNIFICANT CHANGE UP
APTT BLD: 29.8 SEC — SIGNIFICANT CHANGE UP (ref 24.5–35.6)
APTT BLD: 29.8 SEC — SIGNIFICANT CHANGE UP (ref 24.5–35.6)
AST SERPL-CCNC: 14 U/L — SIGNIFICANT CHANGE UP (ref 4–40)
AST SERPL-CCNC: 14 U/L — SIGNIFICANT CHANGE UP (ref 4–40)
B PERT DNA SPEC QL NAA+PROBE: SIGNIFICANT CHANGE UP
B PERT DNA SPEC QL NAA+PROBE: SIGNIFICANT CHANGE UP
B PERT+PARAPERT DNA PNL SPEC NAA+PROBE: SIGNIFICANT CHANGE UP
B PERT+PARAPERT DNA PNL SPEC NAA+PROBE: SIGNIFICANT CHANGE UP
BACTERIA # UR AUTO: NEGATIVE /HPF — SIGNIFICANT CHANGE UP
BACTERIA # UR AUTO: NEGATIVE /HPF — SIGNIFICANT CHANGE UP
BASE EXCESS BLDV CALC-SCNC: -1.5 MMOL/L — SIGNIFICANT CHANGE UP (ref -2–3)
BASE EXCESS BLDV CALC-SCNC: -1.5 MMOL/L — SIGNIFICANT CHANGE UP (ref -2–3)
BASOPHILS # BLD AUTO: 0.05 K/UL — SIGNIFICANT CHANGE UP (ref 0–0.2)
BASOPHILS # BLD AUTO: 0.05 K/UL — SIGNIFICANT CHANGE UP (ref 0–0.2)
BASOPHILS NFR BLD AUTO: 0.5 % — SIGNIFICANT CHANGE UP (ref 0–2)
BASOPHILS NFR BLD AUTO: 0.5 % — SIGNIFICANT CHANGE UP (ref 0–2)
BILIRUB SERPL-MCNC: 0.2 MG/DL — SIGNIFICANT CHANGE UP (ref 0.2–1.2)
BILIRUB SERPL-MCNC: 0.2 MG/DL — SIGNIFICANT CHANGE UP (ref 0.2–1.2)
BILIRUB UR-MCNC: NEGATIVE — SIGNIFICANT CHANGE UP
BILIRUB UR-MCNC: NEGATIVE — SIGNIFICANT CHANGE UP
BLOOD GAS VENOUS COMPREHENSIVE RESULT: SIGNIFICANT CHANGE UP
BLOOD GAS VENOUS COMPREHENSIVE RESULT: SIGNIFICANT CHANGE UP
BORDETELLA PARAPERTUSSIS (RAPRVP): SIGNIFICANT CHANGE UP
BORDETELLA PARAPERTUSSIS (RAPRVP): SIGNIFICANT CHANGE UP
BUN SERPL-MCNC: 35 MG/DL — HIGH (ref 7–23)
BUN SERPL-MCNC: 35 MG/DL — HIGH (ref 7–23)
BUN SERPL-MCNC: 50 MG/DL — HIGH (ref 7–23)
BUN SERPL-MCNC: 50 MG/DL — HIGH (ref 7–23)
C PNEUM DNA SPEC QL NAA+PROBE: SIGNIFICANT CHANGE UP
C PNEUM DNA SPEC QL NAA+PROBE: SIGNIFICANT CHANGE UP
CALCIUM SERPL-MCNC: 9.5 MG/DL — SIGNIFICANT CHANGE UP (ref 8.4–10.5)
CALCIUM SERPL-MCNC: 9.5 MG/DL — SIGNIFICANT CHANGE UP (ref 8.4–10.5)
CALCIUM SERPL-MCNC: 9.7 MG/DL — SIGNIFICANT CHANGE UP (ref 8.4–10.5)
CALCIUM SERPL-MCNC: 9.7 MG/DL — SIGNIFICANT CHANGE UP (ref 8.4–10.5)
CAST: 2 /LPF — SIGNIFICANT CHANGE UP (ref 0–4)
CAST: 2 /LPF — SIGNIFICANT CHANGE UP (ref 0–4)
CHLORIDE BLDV-SCNC: 108 MMOL/L — SIGNIFICANT CHANGE UP (ref 96–108)
CHLORIDE BLDV-SCNC: 108 MMOL/L — SIGNIFICANT CHANGE UP (ref 96–108)
CHLORIDE SERPL-SCNC: 105 MMOL/L — SIGNIFICANT CHANGE UP (ref 98–107)
CHLORIDE SERPL-SCNC: 105 MMOL/L — SIGNIFICANT CHANGE UP (ref 98–107)
CHLORIDE SERPL-SCNC: 106 MMOL/L — SIGNIFICANT CHANGE UP (ref 98–107)
CHLORIDE SERPL-SCNC: 106 MMOL/L — SIGNIFICANT CHANGE UP (ref 98–107)
CO2 BLDV-SCNC: 26.2 MMOL/L — HIGH (ref 22–26)
CO2 BLDV-SCNC: 26.2 MMOL/L — HIGH (ref 22–26)
CO2 SERPL-SCNC: 21 MMOL/L — LOW (ref 22–31)
CO2 SERPL-SCNC: 21 MMOL/L — LOW (ref 22–31)
CO2 SERPL-SCNC: 24 MMOL/L — SIGNIFICANT CHANGE UP (ref 22–31)
CO2 SERPL-SCNC: 24 MMOL/L — SIGNIFICANT CHANGE UP (ref 22–31)
COLOR SPEC: YELLOW — SIGNIFICANT CHANGE UP
COLOR SPEC: YELLOW — SIGNIFICANT CHANGE UP
CREAT SERPL-MCNC: 2.05 MG/DL — HIGH (ref 0.5–1.3)
CREAT SERPL-MCNC: 2.05 MG/DL — HIGH (ref 0.5–1.3)
CREAT SERPL-MCNC: 2.84 MG/DL — HIGH (ref 0.5–1.3)
CREAT SERPL-MCNC: 2.84 MG/DL — HIGH (ref 0.5–1.3)
DIFF PNL FLD: ABNORMAL
DIFF PNL FLD: ABNORMAL
EGFR: 24 ML/MIN/1.73M2 — LOW
EGFR: 24 ML/MIN/1.73M2 — LOW
EGFR: 36 ML/MIN/1.73M2 — LOW
EGFR: 36 ML/MIN/1.73M2 — LOW
EOSINOPHIL # BLD AUTO: 0.27 K/UL — SIGNIFICANT CHANGE UP (ref 0–0.5)
EOSINOPHIL # BLD AUTO: 0.27 K/UL — SIGNIFICANT CHANGE UP (ref 0–0.5)
EOSINOPHIL NFR BLD AUTO: 2.6 % — SIGNIFICANT CHANGE UP (ref 0–6)
EOSINOPHIL NFR BLD AUTO: 2.6 % — SIGNIFICANT CHANGE UP (ref 0–6)
FLUAV SUBTYP SPEC NAA+PROBE: SIGNIFICANT CHANGE UP
FLUAV SUBTYP SPEC NAA+PROBE: SIGNIFICANT CHANGE UP
FLUBV RNA SPEC QL NAA+PROBE: SIGNIFICANT CHANGE UP
FLUBV RNA SPEC QL NAA+PROBE: SIGNIFICANT CHANGE UP
GAS PNL BLDV: 140 MMOL/L — SIGNIFICANT CHANGE UP (ref 136–145)
GAS PNL BLDV: 140 MMOL/L — SIGNIFICANT CHANGE UP (ref 136–145)
GLUCOSE BLDV-MCNC: 112 MG/DL — HIGH (ref 70–99)
GLUCOSE BLDV-MCNC: 112 MG/DL — HIGH (ref 70–99)
GLUCOSE SERPL-MCNC: 111 MG/DL — HIGH (ref 70–99)
GLUCOSE SERPL-MCNC: 111 MG/DL — HIGH (ref 70–99)
GLUCOSE SERPL-MCNC: 152 MG/DL — HIGH (ref 70–99)
GLUCOSE SERPL-MCNC: 152 MG/DL — HIGH (ref 70–99)
GLUCOSE UR QL: NEGATIVE MG/DL — SIGNIFICANT CHANGE UP
GLUCOSE UR QL: NEGATIVE MG/DL — SIGNIFICANT CHANGE UP
HADV DNA SPEC QL NAA+PROBE: SIGNIFICANT CHANGE UP
HADV DNA SPEC QL NAA+PROBE: SIGNIFICANT CHANGE UP
HCO3 BLDV-SCNC: 25 MMOL/L — SIGNIFICANT CHANGE UP (ref 22–29)
HCO3 BLDV-SCNC: 25 MMOL/L — SIGNIFICANT CHANGE UP (ref 22–29)
HCOV 229E RNA SPEC QL NAA+PROBE: SIGNIFICANT CHANGE UP
HCOV 229E RNA SPEC QL NAA+PROBE: SIGNIFICANT CHANGE UP
HCOV HKU1 RNA SPEC QL NAA+PROBE: SIGNIFICANT CHANGE UP
HCOV HKU1 RNA SPEC QL NAA+PROBE: SIGNIFICANT CHANGE UP
HCOV NL63 RNA SPEC QL NAA+PROBE: SIGNIFICANT CHANGE UP
HCOV NL63 RNA SPEC QL NAA+PROBE: SIGNIFICANT CHANGE UP
HCOV OC43 RNA SPEC QL NAA+PROBE: SIGNIFICANT CHANGE UP
HCOV OC43 RNA SPEC QL NAA+PROBE: SIGNIFICANT CHANGE UP
HCT VFR BLD CALC: 41 % — SIGNIFICANT CHANGE UP (ref 39–50)
HCT VFR BLD CALC: 41 % — SIGNIFICANT CHANGE UP (ref 39–50)
HCT VFR BLDA CALC: 41 % — SIGNIFICANT CHANGE UP (ref 39–51)
HCT VFR BLDA CALC: 41 % — SIGNIFICANT CHANGE UP (ref 39–51)
HGB BLD CALC-MCNC: 13.8 G/DL — SIGNIFICANT CHANGE UP (ref 12.6–17.4)
HGB BLD CALC-MCNC: 13.8 G/DL — SIGNIFICANT CHANGE UP (ref 12.6–17.4)
HGB BLD-MCNC: 13.4 G/DL — SIGNIFICANT CHANGE UP (ref 13–17)
HGB BLD-MCNC: 13.4 G/DL — SIGNIFICANT CHANGE UP (ref 13–17)
HMPV RNA SPEC QL NAA+PROBE: SIGNIFICANT CHANGE UP
HMPV RNA SPEC QL NAA+PROBE: SIGNIFICANT CHANGE UP
HPIV1 RNA SPEC QL NAA+PROBE: SIGNIFICANT CHANGE UP
HPIV1 RNA SPEC QL NAA+PROBE: SIGNIFICANT CHANGE UP
HPIV2 RNA SPEC QL NAA+PROBE: SIGNIFICANT CHANGE UP
HPIV2 RNA SPEC QL NAA+PROBE: SIGNIFICANT CHANGE UP
HPIV3 RNA SPEC QL NAA+PROBE: SIGNIFICANT CHANGE UP
HPIV3 RNA SPEC QL NAA+PROBE: SIGNIFICANT CHANGE UP
HPIV4 RNA SPEC QL NAA+PROBE: SIGNIFICANT CHANGE UP
HPIV4 RNA SPEC QL NAA+PROBE: SIGNIFICANT CHANGE UP
IANC: 8.03 K/UL — HIGH (ref 1.8–7.4)
IANC: 8.03 K/UL — HIGH (ref 1.8–7.4)
IMM GRANULOCYTES NFR BLD AUTO: 0.5 % — SIGNIFICANT CHANGE UP (ref 0–0.9)
IMM GRANULOCYTES NFR BLD AUTO: 0.5 % — SIGNIFICANT CHANGE UP (ref 0–0.9)
INR BLD: 1.08 RATIO — SIGNIFICANT CHANGE UP (ref 0.85–1.18)
INR BLD: 1.08 RATIO — SIGNIFICANT CHANGE UP (ref 0.85–1.18)
KETONES UR-MCNC: NEGATIVE MG/DL — SIGNIFICANT CHANGE UP
KETONES UR-MCNC: NEGATIVE MG/DL — SIGNIFICANT CHANGE UP
LACTATE BLDV-MCNC: 1 MMOL/L — SIGNIFICANT CHANGE UP (ref 0.5–2)
LACTATE BLDV-MCNC: 1 MMOL/L — SIGNIFICANT CHANGE UP (ref 0.5–2)
LEUKOCYTE ESTERASE UR-ACNC: ABNORMAL
LEUKOCYTE ESTERASE UR-ACNC: ABNORMAL
LYMPHOCYTES # BLD AUTO: 1.21 K/UL — SIGNIFICANT CHANGE UP (ref 1–3.3)
LYMPHOCYTES # BLD AUTO: 1.21 K/UL — SIGNIFICANT CHANGE UP (ref 1–3.3)
LYMPHOCYTES # BLD AUTO: 11.7 % — LOW (ref 13–44)
LYMPHOCYTES # BLD AUTO: 11.7 % — LOW (ref 13–44)
M PNEUMO DNA SPEC QL NAA+PROBE: SIGNIFICANT CHANGE UP
M PNEUMO DNA SPEC QL NAA+PROBE: SIGNIFICANT CHANGE UP
MCHC RBC-ENTMCNC: 28 PG — SIGNIFICANT CHANGE UP (ref 27–34)
MCHC RBC-ENTMCNC: 28 PG — SIGNIFICANT CHANGE UP (ref 27–34)
MCHC RBC-ENTMCNC: 32.7 GM/DL — SIGNIFICANT CHANGE UP (ref 32–36)
MCHC RBC-ENTMCNC: 32.7 GM/DL — SIGNIFICANT CHANGE UP (ref 32–36)
MCV RBC AUTO: 85.8 FL — SIGNIFICANT CHANGE UP (ref 80–100)
MCV RBC AUTO: 85.8 FL — SIGNIFICANT CHANGE UP (ref 80–100)
MONOCYTES # BLD AUTO: 0.76 K/UL — SIGNIFICANT CHANGE UP (ref 0–0.9)
MONOCYTES # BLD AUTO: 0.76 K/UL — SIGNIFICANT CHANGE UP (ref 0–0.9)
MONOCYTES NFR BLD AUTO: 7.3 % — SIGNIFICANT CHANGE UP (ref 2–14)
MONOCYTES NFR BLD AUTO: 7.3 % — SIGNIFICANT CHANGE UP (ref 2–14)
NEUTROPHILS # BLD AUTO: 8.03 K/UL — HIGH (ref 1.8–7.4)
NEUTROPHILS # BLD AUTO: 8.03 K/UL — HIGH (ref 1.8–7.4)
NEUTROPHILS NFR BLD AUTO: 77.4 % — HIGH (ref 43–77)
NEUTROPHILS NFR BLD AUTO: 77.4 % — HIGH (ref 43–77)
NITRITE UR-MCNC: NEGATIVE — SIGNIFICANT CHANGE UP
NITRITE UR-MCNC: NEGATIVE — SIGNIFICANT CHANGE UP
NRBC # BLD: 0 /100 WBCS — SIGNIFICANT CHANGE UP (ref 0–0)
NRBC # BLD: 0 /100 WBCS — SIGNIFICANT CHANGE UP (ref 0–0)
NRBC # FLD: 0 K/UL — SIGNIFICANT CHANGE UP (ref 0–0)
NRBC # FLD: 0 K/UL — SIGNIFICANT CHANGE UP (ref 0–0)
PCO2 BLDV: 47 MMHG — SIGNIFICANT CHANGE UP (ref 42–55)
PCO2 BLDV: 47 MMHG — SIGNIFICANT CHANGE UP (ref 42–55)
PH BLDV: 7.33 — SIGNIFICANT CHANGE UP (ref 7.32–7.43)
PH BLDV: 7.33 — SIGNIFICANT CHANGE UP (ref 7.32–7.43)
PH UR: 6 — SIGNIFICANT CHANGE UP (ref 5–8)
PH UR: 6 — SIGNIFICANT CHANGE UP (ref 5–8)
PLATELET # BLD AUTO: 185 K/UL — SIGNIFICANT CHANGE UP (ref 150–400)
PLATELET # BLD AUTO: 185 K/UL — SIGNIFICANT CHANGE UP (ref 150–400)
PO2 BLDV: 39 MMHG — SIGNIFICANT CHANGE UP (ref 25–45)
PO2 BLDV: 39 MMHG — SIGNIFICANT CHANGE UP (ref 25–45)
POTASSIUM BLDV-SCNC: 5 MMOL/L — SIGNIFICANT CHANGE UP (ref 3.5–5.1)
POTASSIUM BLDV-SCNC: 5 MMOL/L — SIGNIFICANT CHANGE UP (ref 3.5–5.1)
POTASSIUM SERPL-MCNC: 4.6 MMOL/L — SIGNIFICANT CHANGE UP (ref 3.5–5.3)
POTASSIUM SERPL-MCNC: 4.6 MMOL/L — SIGNIFICANT CHANGE UP (ref 3.5–5.3)
POTASSIUM SERPL-MCNC: 4.8 MMOL/L — SIGNIFICANT CHANGE UP (ref 3.5–5.3)
POTASSIUM SERPL-MCNC: 4.8 MMOL/L — SIGNIFICANT CHANGE UP (ref 3.5–5.3)
POTASSIUM SERPL-SCNC: 4.6 MMOL/L — SIGNIFICANT CHANGE UP (ref 3.5–5.3)
POTASSIUM SERPL-SCNC: 4.6 MMOL/L — SIGNIFICANT CHANGE UP (ref 3.5–5.3)
POTASSIUM SERPL-SCNC: 4.8 MMOL/L — SIGNIFICANT CHANGE UP (ref 3.5–5.3)
POTASSIUM SERPL-SCNC: 4.8 MMOL/L — SIGNIFICANT CHANGE UP (ref 3.5–5.3)
PROT SERPL-MCNC: 7.5 G/DL — SIGNIFICANT CHANGE UP (ref 6–8.3)
PROT SERPL-MCNC: 7.5 G/DL — SIGNIFICANT CHANGE UP (ref 6–8.3)
PROT UR-MCNC: 100 MG/DL
PROT UR-MCNC: 100 MG/DL
PROTHROM AB SERPL-ACNC: 12.2 SEC — SIGNIFICANT CHANGE UP (ref 9.5–13)
PROTHROM AB SERPL-ACNC: 12.2 SEC — SIGNIFICANT CHANGE UP (ref 9.5–13)
RAPID RVP RESULT: SIGNIFICANT CHANGE UP
RAPID RVP RESULT: SIGNIFICANT CHANGE UP
RBC # BLD: 4.78 M/UL — SIGNIFICANT CHANGE UP (ref 4.2–5.8)
RBC # BLD: 4.78 M/UL — SIGNIFICANT CHANGE UP (ref 4.2–5.8)
RBC # FLD: 14.2 % — SIGNIFICANT CHANGE UP (ref 10.3–14.5)
RBC # FLD: 14.2 % — SIGNIFICANT CHANGE UP (ref 10.3–14.5)
RBC CASTS # UR COMP ASSIST: 1 /HPF — SIGNIFICANT CHANGE UP (ref 0–4)
RBC CASTS # UR COMP ASSIST: 1 /HPF — SIGNIFICANT CHANGE UP (ref 0–4)
REVIEW: SIGNIFICANT CHANGE UP
REVIEW: SIGNIFICANT CHANGE UP
RSV RNA SPEC QL NAA+PROBE: SIGNIFICANT CHANGE UP
RSV RNA SPEC QL NAA+PROBE: SIGNIFICANT CHANGE UP
RV+EV RNA SPEC QL NAA+PROBE: SIGNIFICANT CHANGE UP
RV+EV RNA SPEC QL NAA+PROBE: SIGNIFICANT CHANGE UP
SAO2 % BLDV: 44.4 % — LOW (ref 67–88)
SAO2 % BLDV: 44.4 % — LOW (ref 67–88)
SARS-COV-2 RNA SPEC QL NAA+PROBE: SIGNIFICANT CHANGE UP
SARS-COV-2 RNA SPEC QL NAA+PROBE: SIGNIFICANT CHANGE UP
SODIUM SERPL-SCNC: 140 MMOL/L — SIGNIFICANT CHANGE UP (ref 135–145)
SP GR SPEC: 1.01 — SIGNIFICANT CHANGE UP (ref 1–1.03)
SP GR SPEC: 1.01 — SIGNIFICANT CHANGE UP (ref 1–1.03)
SQUAMOUS # UR AUTO: 1 /HPF — SIGNIFICANT CHANGE UP (ref 0–5)
SQUAMOUS # UR AUTO: 1 /HPF — SIGNIFICANT CHANGE UP (ref 0–5)
TROPONIN T, HIGH SENSITIVITY RESULT: 71 NG/L — CRITICAL HIGH
TROPONIN T, HIGH SENSITIVITY RESULT: 71 NG/L — CRITICAL HIGH
TROPONIN T, HIGH SENSITIVITY RESULT: 74 NG/L — CRITICAL HIGH
TROPONIN T, HIGH SENSITIVITY RESULT: 74 NG/L — CRITICAL HIGH
UROBILINOGEN FLD QL: 0.2 MG/DL — SIGNIFICANT CHANGE UP (ref 0.2–1)
UROBILINOGEN FLD QL: 0.2 MG/DL — SIGNIFICANT CHANGE UP (ref 0.2–1)
WBC # BLD: 10.37 K/UL — SIGNIFICANT CHANGE UP (ref 3.8–10.5)
WBC # BLD: 10.37 K/UL — SIGNIFICANT CHANGE UP (ref 3.8–10.5)
WBC # FLD AUTO: 10.37 K/UL — SIGNIFICANT CHANGE UP (ref 3.8–10.5)
WBC # FLD AUTO: 10.37 K/UL — SIGNIFICANT CHANGE UP (ref 3.8–10.5)
WBC UR QL: 4 /HPF — SIGNIFICANT CHANGE UP (ref 0–5)
WBC UR QL: 4 /HPF — SIGNIFICANT CHANGE UP (ref 0–5)

## 2023-11-10 PROCEDURE — 71046 X-RAY EXAM CHEST 2 VIEWS: CPT | Mod: 26

## 2023-11-10 PROCEDURE — 99223 1ST HOSP IP/OBS HIGH 75: CPT

## 2023-11-10 PROCEDURE — 74176 CT ABD & PELVIS W/O CONTRAST: CPT | Mod: 26,MA

## 2023-11-10 PROCEDURE — 99285 EMERGENCY DEPT VISIT HI MDM: CPT

## 2023-11-10 RX ORDER — VANCOMYCIN HCL 1 G
1000 VIAL (EA) INTRAVENOUS ONCE
Refills: 0 | Status: COMPLETED | OUTPATIENT
Start: 2023-11-10 | End: 2023-11-10

## 2023-11-10 RX ORDER — ROSUVASTATIN CALCIUM 5 MG/1
1 TABLET ORAL
Refills: 0 | DISCHARGE

## 2023-11-10 RX ORDER — HEPARIN SODIUM 5000 [USP'U]/ML
5000 INJECTION INTRAVENOUS; SUBCUTANEOUS EVERY 12 HOURS
Refills: 0 | Status: DISCONTINUED | OUTPATIENT
Start: 2023-11-10 | End: 2023-11-10

## 2023-11-10 RX ORDER — ATORVASTATIN CALCIUM 80 MG/1
40 TABLET, FILM COATED ORAL AT BEDTIME
Refills: 0 | Status: DISCONTINUED | OUTPATIENT
Start: 2023-11-10 | End: 2023-11-16

## 2023-11-10 RX ORDER — CEFEPIME 1 G/1
INJECTION, POWDER, FOR SOLUTION INTRAMUSCULAR; INTRAVENOUS
Refills: 0 | Status: DISCONTINUED | OUTPATIENT
Start: 2023-11-10 | End: 2023-11-10

## 2023-11-10 RX ORDER — LORLATINIB 100 MG/1
1 TABLET, FILM COATED ORAL
Refills: 0 | DISCHARGE

## 2023-11-10 RX ORDER — TAMSULOSIN HYDROCHLORIDE 0.4 MG/1
0.4 CAPSULE ORAL AT BEDTIME
Refills: 0 | Status: DISCONTINUED | OUTPATIENT
Start: 2023-11-10 | End: 2023-11-16

## 2023-11-10 RX ORDER — FINASTERIDE 5 MG/1
5 TABLET, FILM COATED ORAL DAILY
Refills: 0 | Status: DISCONTINUED | OUTPATIENT
Start: 2023-11-10 | End: 2023-11-16

## 2023-11-10 RX ORDER — LANOLIN ALCOHOL/MO/W.PET/CERES
3 CREAM (GRAM) TOPICAL AT BEDTIME
Refills: 0 | Status: DISCONTINUED | OUTPATIENT
Start: 2023-11-10 | End: 2023-11-16

## 2023-11-10 RX ORDER — CEFEPIME 1 G/1
2000 INJECTION, POWDER, FOR SOLUTION INTRAMUSCULAR; INTRAVENOUS ONCE
Refills: 0 | Status: COMPLETED | OUTPATIENT
Start: 2023-11-10 | End: 2023-11-10

## 2023-11-10 RX ORDER — ONDANSETRON 8 MG/1
4 TABLET, FILM COATED ORAL EVERY 8 HOURS
Refills: 0 | Status: DISCONTINUED | OUTPATIENT
Start: 2023-11-10 | End: 2023-11-16

## 2023-11-10 RX ORDER — TAMSULOSIN HYDROCHLORIDE 0.4 MG/1
1 CAPSULE ORAL
Refills: 0 | DISCHARGE

## 2023-11-10 RX ORDER — SODIUM CHLORIDE 9 MG/ML
1000 INJECTION, SOLUTION INTRAVENOUS
Refills: 0 | Status: DISCONTINUED | OUTPATIENT
Start: 2023-11-10 | End: 2023-11-11

## 2023-11-10 RX ORDER — ALPRAZOLAM 0.25 MG
0.25 TABLET ORAL ONCE
Refills: 0 | Status: DISCONTINUED | OUTPATIENT
Start: 2023-11-10 | End: 2023-11-10

## 2023-11-10 RX ORDER — ACETAMINOPHEN 500 MG
650 TABLET ORAL EVERY 6 HOURS
Refills: 0 | Status: DISCONTINUED | OUTPATIENT
Start: 2023-11-10 | End: 2023-11-10

## 2023-11-10 RX ORDER — CEFEPIME 1 G/1
2000 INJECTION, POWDER, FOR SOLUTION INTRAMUSCULAR; INTRAVENOUS EVERY 8 HOURS
Refills: 0 | Status: DISCONTINUED | OUTPATIENT
Start: 2023-11-10 | End: 2023-11-10

## 2023-11-10 RX ORDER — ACETAMINOPHEN 500 MG
650 TABLET ORAL EVERY 6 HOURS
Refills: 0 | Status: DISCONTINUED | OUTPATIENT
Start: 2023-11-10 | End: 2023-11-16

## 2023-11-10 RX ADMIN — Medication 650 MILLIGRAM(S): at 20:40

## 2023-11-10 RX ADMIN — CEFEPIME 100 MILLIGRAM(S): 1 INJECTION, POWDER, FOR SOLUTION INTRAMUSCULAR; INTRAVENOUS at 02:37

## 2023-11-10 RX ADMIN — Medication 0.25 MILLIGRAM(S): at 18:42

## 2023-11-10 RX ADMIN — FINASTERIDE 5 MILLIGRAM(S): 5 TABLET, FILM COATED ORAL at 14:49

## 2023-11-10 RX ADMIN — SODIUM CHLORIDE 150 MILLILITER(S): 9 INJECTION, SOLUTION INTRAVENOUS at 20:39

## 2023-11-10 RX ADMIN — Medication 650 MILLIGRAM(S): at 22:00

## 2023-11-10 RX ADMIN — Medication 0.25 MILLIGRAM(S): at 05:51

## 2023-11-10 RX ADMIN — Medication 250 MILLIGRAM(S): at 03:35

## 2023-11-10 RX ADMIN — SODIUM CHLORIDE 150 MILLILITER(S): 9 INJECTION, SOLUTION INTRAVENOUS at 14:49

## 2023-11-10 RX ADMIN — Medication 650 MILLIGRAM(S): at 13:10

## 2023-11-10 NOTE — ED ADULT NURSE NOTE - OBJECTIVE STATEMENT
63 year old male, received to room 22. Pt A&Ox4, ambulatory. Respirations equal and unlabored. Past medical history of lung cancer, on oral chemo. Pt sent to ED by oncologist for abnormal creatinine levels. Pt endorsing SOB x6 days as well as lower back pain. Pt denies any urinary symptoms, abdominal pain, chest pain, palpitations, dizziness, blurry vision, fevers, chills any other complaints. Neuro intact. 20G IV placed to R antecubital space. Labs drawn and sent. Pending XR/CT. No acute distress noted. Safety maintained, bed in lowest position, side rails raised, call bell in reach.

## 2023-11-10 NOTE — ED ADULT NURSE REASSESSMENT NOTE - NS ED NURSE REASSESS COMMENT FT1
repeat troponin drawn and sent to lab. iv abx infusing without complications, iv site WNL. maintained on cardiac monitor, NSR at 97 bpm. pt resting in stretcher in NAD, RR even and unlabored. safety measures maintained, wife at bedside.

## 2023-11-10 NOTE — H&P ADULT - PROBLEM SELECTOR PLAN 1
creat 2.84 from baseline 1.0  CT abdomen with bilateral hydronephrosis  Postrenal KAMI  s/p Jeronimo catheter placed by Urology with 1L of urine output  -monitor BMP after jeronimo placement for improvement of renal function  -avoid nephrotoxic agents  -gentle IVFs creat 2.84 from baseline 1.0  CT abdomen with bilateral hydronephrosis  Postrenal KAMI  s/p Jeronimo catheter placed by Urology with 1L of urine output  -monitor BMP after jeronimo placement for improvement of renal function  -avoid nephrotoxic agents  -start IVFs

## 2023-11-10 NOTE — H&P ADULT - NSHPLABSRESULTS_GEN_ALL_CORE
.  LABS:                         13.4   10.37 )-----------( 185      ( 10 Nov 2023 02:00 )             41.0     11-10    140  |  106  |  50<H>  ----------------------------<  111<H>  4.8   |  21<L>  |  2.84<H>    Ca    9.7      10 Nov 2023 02:00    TPro  7.5  /  Alb  4.5  /  TBili  0.2  /  DBili  x   /  AST  14  /  ALT  9   /  AlkPhos  68  11-10    PT/INR - ( 10 Nov 2023 02:00 )   PT: 12.2 sec;   INR: 1.08 ratio         PTT - ( 10 Nov 2023 02: )  PTT:29.8 sec  Urinalysis Basic - ( 10 Nov 2023 02:00 )    Color: Yellow / Appearance: Clear / S.013 / pH: x  Gluc: 111 mg/dL / Ketone: Negative mg/dL  / Bili: Negative / Urobili: 0.2 mg/dL   Blood: x / Protein: 100 mg/dL / Nitrite: Negative   Leuk Esterase: Trace / RBC: 1 /HPF / WBC 4 /HPF   Sq Epi: x / Non Sq Epi: 1 /HPF / Bacteria: Negative /HPF                RADIOLOGY, EKG & ADDITIONAL TESTS: Reviewed.   CXR personally reviewed: no focal consolidation, no pneumothorax, clear lungs    < from: CT Abdomen and Pelvis No Cont (11.10.23 @ 02:23) >    IMPRESSION:    Compared to the recent CT of 10/26/23, redemonstration of moderate   bilateral hydroureteronephrosis, distended urinary bladder and enlarged   prostate gland in the setting of obstructive uropathy.  Consider Christopher   catheterization.    Slight interval decrease in size of bilateral external iliac lymph nodes.            --- End of Report ---    < end of copied text >

## 2023-11-10 NOTE — PATIENT PROFILE ADULT - FALL HARM RISK - HARM RISK INTERVENTIONS

## 2023-11-10 NOTE — CHART NOTE - NSCHARTNOTEFT_GEN_A_CORE
This report was requested by: Seven Pretty | Reference #: [id number]      Patient Demographic Information (PDI)       PDI	First Name	Last Name	Birth Date	Gender	Street Address	[facility]	Zip Code  SILVIO Lowe	1960	Male	[address]	[place]	[address]    Prescription Information      PDI Filter:    PDI	My Rx	Current Rx	Drug Type	Rx Written	Rx Dispensed	Drug	Quantity	Days Supply	Prescriber Name	Prescriber CHAPO #  A	N	N	B	08/03/2023	08/04/2023	diazepam 2 mg tablet	5	5	Ant Levine	[id number]  Payment Method Insurance  [facility] #[id number]  A	N	N	B	04/20/2023	04/22/2023	alprazolam 0.5 mg tablet	120	30	Shaheen Morgan MD	[id number]  Payment Method Insurance  [facility] #[id number]  A	N	N		09/28/2022	03/21/2023	zolpidem tartrate 10 mg tablet	30	30	Shaheen Morgan MD	[id number]

## 2023-11-10 NOTE — ED PROVIDER NOTE - CLINICAL SUMMARY MEDICAL DECISION MAKING FREE TEXT BOX
62 yo with active lung CA on chemo presenting with elevated Creatinine, SOB, back pain while urinating, and a new suprapubic mass. patient tachy with increased WOB and CTA bilat. Assess for infectious cause vs new onset HF (unlikely given clear lungs) vs renal dysfunction requiring dialysis. Likely Admit. Erika Sanders, DO PGY1

## 2023-11-10 NOTE — ED ADULT NURSE NOTE - CHIEF COMPLAINT QUOTE
sent in by MD for Elevated  creatinine levels. Pt also endorsing SOB x6 days. No complaints of chest pain, headache, nausea, dizziness, vomiting fever, chills verbalized. Phx  lung CA. on chemo.

## 2023-11-10 NOTE — ED PROVIDER NOTE - ATTENDING CONTRIBUTION TO CARE
63-year-old male with history of lung cancer on oral chemo sent in by PMD for elevated creatinine to 5.  No history of similar symptoms in the past.  Patient also reports he has been feeling short of breath with exertion for past few days.  No shortness of breath at rest.  Since yesterday started noticing a mass of her right lower abdomen.  No abdominal pain, nausea, vomiting, diarrhea.  Reports increased urination.  Patient states he was placed on Cipro recently.  States his last CAT scan was within past couple of days–did not show mass.  States the symptoms started after that CAT scan.  No fevers, chills, chest pain.  Patient is nontoxic-appearing.  Clear to auscultation bilaterally.  Regular rhythm with mild tachycardia.  No murmurs appreciated.  Abdomen soft nontender. No overlying skin changes. +firmness appreciated in RLQ. Pt is tachycardic. Ddx include but not limited to KAMI, electrolyte abnormality, ACS,  intraabdominal pathology including mass.

## 2023-11-10 NOTE — ED ADULT TRIAGE NOTE - CHIEF COMPLAINT QUOTE
sent in by MD for Elevated  creatinine levels. No complaints of chest pain, headache, nausea, dizziness, vomiting fever, chills verbalized. Phx  lung CA. on chemo. sent in by MD for Elevated  creatinine levels. Pt also endorsing SOB x6 days. No complaints of chest pain, headache, nausea, dizziness, vomiting fever, chills verbalized. Phx  lung CA. on chemo.

## 2023-11-10 NOTE — H&P ADULT - NSICDXPASTMEDICALHX_GEN_ALL_CORE_FT
PAST MEDICAL HISTORY:  Adenocarcinoma of lung     Benign prostate hyperplasia     HLD (hyperlipidemia)     Lung cancer metastatic to brain     Testicular cancer

## 2023-11-10 NOTE — H&P ADULT - PROBLEM SELECTOR PLAN 4
-c/w home statin diagnosed in 2009 s/p resection followed by CRT with subsequent metastatic spread currently on targeted therapy with Lorbrena (lorlatinib)  Follows outpatient with Dr. Luna  -continue home lorlatinib 100mg daily (patient brought home medication), need onc consult per pharmacy (requested)

## 2023-11-10 NOTE — H&P ADULT - PROBLEM SELECTOR PLAN 2
Moderate bilateral hydroureteronephrosis, distended urinary bladder and enlarged   prostate gland.  U/A essentially negative   -s/p Jeronimo catheter placement by Urology  -now with gross hematuria- clearing  -c/w home tamsulosin  -add proscar per Urology  - Monitor for post obstructive diuresis   - Maintain strict intake and output   -  1/2 NS replacement   - Follow up urine culture   - Keep jeronimo for at least 5 days  -Patient will follow-up with established outpatient Urologist, chelsea an appointment next Monday 11/13 Moderate bilateral hydroureteronephrosis, distended urinary bladder and enlarged   prostate gland.  U/A without significant pyuria, no bacteria   -s/p Christopher catheter placement by Urology  -now with gross hematuria- clearing  -c/w home tamsulosin  -add proscar per Urology  - Monitor for post obstructive diuresis   - Maintain strict intake and output   -  1/2 NS IVFs replacement   - Follow up urine culture (U/A negative)  - Keep Christopher for at least 5 days per Urology  -Patient will follow-up with established outpatient Urologist, chelsea an appointment next Monday 11/13 Moderate bilateral hydroureteronephrosis, distended urinary bladder and enlarged   prostate gland.  s/p Cefepime in the ED, U/A without significant pyuria, no bacteria, will hold off on further Abx   -s/p Christopher catheter placement by Urology  -now with gross hematuria- clearing  -c/w home tamsulosin  -add proscar per Urology  - Monitor for post obstructive diuresis   - Maintain strict intake and output   -  1/2 NS IVFs replacement   - Follow up urine culture (U/A negative)  - Keep Christopher for at least 5 days per Urology  -Patient will follow-up with established outpatient Urologist, hs an appointment next Monday 11/13

## 2023-11-10 NOTE — H&P ADULT - NSHPPHYSICALEXAM_GEN_ALL_CORE
.  VITAL SIGNS:  T(C): 37.1 (11-10-23 @ 08:23), Max: 37.1 (11-10-23 @ 00:35)  T(F): 98.8 (11-10-23 @ 08:23), Max: 98.8 (11-10-23 @ 00:35)  HR: 96 (11-10-23 @ 11:39) (92 - 118)  BP: 140/85 (11-10-23 @ 11:39) (140/82 - 166/101)  BP(mean): --  RR: 22 (11-10-23 @ 11:39) (17 - 24)  SpO2: 99% (11-10-23 @ 11:39) (98% - 100%)  Wt(kg): --    PHYSICAL EXAM:    Constitutional: resting comfortably in bed; NAD  Head: NC/AT  Eyes: PERRL, EOMI, anicteric sclera  ENT: no nasal discharge; uvula midline, no oropharyngeal erythema or exudates; MMM  Respiratory: CTA B/L; no W/R/R, no retractions  Cardiac: +S1/S2; RRR; no M/R/G; PMI non-displaced  Gastrointestinal: abdomen soft, NT/ND; no rebound or guarding; +BSx4; no CVAT B/L, Christopher catheter with light red urine  Extremities: WWP, no clubbing or cyanosis; no peripheral edema  Musculoskeletal: NROM x4; no joint swelling, tenderness or erythema  Vascular: 2+ radial, femoral, DP/PT pulses B/L  Dermatologic: skin warm, dry and intact; no rashes, wounds, or scars  Neurologic: AAOx3; CNII-XII grossly intact; no focal deficits  Psychiatric: affect and characteristics of appearance, verbalizations, behaviors are appropriate

## 2023-11-10 NOTE — ED PROVIDER NOTE - PHYSICAL EXAMINATION
Erika Sanders DO (PGY1)   Physical Exam:    Gen: NAD, AOx3  Lung: CTAB, no wheezes/rhonchi/rales B/L. patient is tachypnic with increased WOB and SOB while talking  CV: RRR, no murmurs, rubs or gallops  Abd: non-reducible suprapubic mass palpated. not ttp

## 2023-11-10 NOTE — ED ADULT NURSE REASSESSMENT NOTE - NS ED NURSE REASSESS COMMENT FT1
Received 63 y.o male in [facility], admitted to telemetry for SOB. At the moment, pt denies SOB states initially he attributed symptoms to pressure in bladder however, symptoms subsided after jeronimo cathter was placed. On assessment, pt is alert and oriented x 4, connected to cardiac monitor with sinus tach rhythm, noted to have 18F jeronimo catheter that's draining clear yellow urine. Re-educated pt on admission status and plan. Pending dispo.

## 2023-11-10 NOTE — PROCEDURE NOTE - ADDITIONAL PROCEDURE DETAILS
Patient presenting with known BPH and urinary retention.  Failed attempts at catheter placement by ED.    18F coude placed without difficulty, with return of ~1000cc clear yellow urine.     In setting of KAMI, monitor for postobstructive diuresis.    - Monitor for post obstructive diuresis (urine output >200/hr for 3 hours)  - Maintain strict intake and output (document UOP in flowsheets ever 4 hours)  - Repeat BMP q6 hours and replete electrolytes as needed until UOP normalizes  - Allow patient to drink to thirst, place 2 liters of water at bedside at all times  - Will require 1/2 NS replacement if continued depletion despite adequate PO intake   - Follow up urine culture   - Continue flomax 0.4mg and proscar 5mg, ensure patient has Rx's on discharge  -Optimize for TOV: encourage ambulation/OOB, bowel regiment, minimize narcotics/benzodiazepines/anticholinergics/antihistamines, stand when voiding if possible, timed voids  - Keep jeronimo for at least 5 days  -Patient will follow-up with established outpatient Urologist, hs an appointment next week.

## 2023-11-10 NOTE — ED ADULT NURSE REASSESSMENT NOTE - NS ED NURSE REASSESS COMMENT FT1
18G coude tip jeronimo catheter placed by urology successfully, pt tolerated well. approx 1200cc of clear yellow urine draining without complications. awaiting admitting bed placement.

## 2023-11-10 NOTE — ED PROVIDER NOTE - OBJECTIVE STATEMENT
63-year-old male history of lung cancer on chemo sent in by oncologist for elevated creatinine of 5 that was picked up on outpatient lab work.  States for the past 3 days he has been short of breath while walking and at rest and also has been endorsing 3 days of bilateral back pain while urinating.  States he was placed on Cipro by his oncologist for back pain.  Endorses new abdominal mass in the suprapubic region. Denies fever, chills, chest pain, abdominal pain, dysuria, hematuria, nausea, vomiting, diarrhea, or any other symptoms at this time. Erika Sanders, DO PGY1

## 2023-11-10 NOTE — PATIENT PROFILE ADULT - FUNCTIONAL ASSESSMENT - DAILY ACTIVITY 6.
I will SWITCH the dose or number of times a day I take the medications listed below when I get home from the hospital:  None
4 = No assist / stand by assistance

## 2023-11-10 NOTE — ED ADULT NURSE NOTE - NSFALLUNIVINTERV_ED_ALL_ED
Bed/Stretcher in lowest position, wheels locked, appropriate side rails in place/Call bell, personal items and telephone in reach/Instruct patient to call for assistance before getting out of bed/chair/stretcher/Non-slip footwear applied when patient is off stretcher/[place] to call system/Physically safe environment - no spills, clutter or unnecessary equipment/Purposeful proactive rounding/Room/bathroom lighting operational, light cord in reach

## 2023-11-10 NOTE — ED ADULT NURSE REASSESSMENT NOTE - NS ED NURSE REASSESS COMMENT FT1
attempted for jeronimo insertion x2, unsuccessful with regular and coude catheter. MD made aware and at bedside. awaiting urology consult. pt able to walk to bathroom with steady gait and urinate, returned safely to stretcher.

## 2023-11-10 NOTE — H&P ADULT - PROBLEM SELECTOR PLAN 3
diagnosed in 2009 s/p resection followed by CRT with subsequent metastatic spread currently on targeted therapy with Lorbrena (lorlatinib)  Follows outpatient with Dr. Luna  -continue home lorlatinib 100mg daily (patient brought home medication), need onc consult per pharmacy (requested) Troponin 74--> 71  NO chest pain, EKG without ishcemic changes  likely non cardiac, due to renal dysfunction  -f.u TTE

## 2023-11-10 NOTE — H&P ADULT - HISTORY OF PRESENT ILLNESS
64 y.o male with hx of testicular cancer s/p orchectomy, lung adenocarcinoma with brain mets on lorlatinib, BPH, HLD, who presents for abnormal labs and abdominal distention.    Patient report for the last 2 days he has noticed suprapubic distention, and felt like he couldn't breath. State as baseline he exercises and walks the whole [employer] without issues, without shortness of breath or chest pain. Since he has had the abdominal distention he had associated shortness of breath. Denies any chest pain, dysuria, changes in urine color or smell, fevers or chills. He states otherwise has been well, taking his medications regularly.     In the ED patient noted with creat 2.84, CT abdomen showed moderate bilateral hydroureteronephrosis, distended urinary bladder and enlarged prostate gland in the setting of obstructive uropathy. Christopher was placed by Urology.    Patient report significant improvement after Christopher placement.

## 2023-11-10 NOTE — ED PROVIDER NOTE - PROGRESS NOTE DETAILS
Patient has enlarged bladder not relieved by urination. Christopher to relieve abdominal pain and distention Patient has enlarged bladder not relieved by urination. Chirstopher to relieve abdominal pain and distention. Likely cause of KAMI. Admit for KAMI and echo/stress in setting of new onset exertional dyspnea and as he has not seen a cardiologist in 4 years. Erika Sanders,  PGY1

## 2023-11-11 LAB
A1C WITH ESTIMATED AVERAGE GLUCOSE RESULT: 5.5 % — SIGNIFICANT CHANGE UP (ref 4–5.6)
A1C WITH ESTIMATED AVERAGE GLUCOSE RESULT: 5.5 % — SIGNIFICANT CHANGE UP (ref 4–5.6)
ANION GAP SERPL CALC-SCNC: 11 MMOL/L — SIGNIFICANT CHANGE UP (ref 7–14)
ANION GAP SERPL CALC-SCNC: 11 MMOL/L — SIGNIFICANT CHANGE UP (ref 7–14)
BUN SERPL-MCNC: 26 MG/DL — HIGH (ref 7–23)
BUN SERPL-MCNC: 26 MG/DL — HIGH (ref 7–23)
CALCIUM SERPL-MCNC: 9.1 MG/DL — SIGNIFICANT CHANGE UP (ref 8.4–10.5)
CALCIUM SERPL-MCNC: 9.1 MG/DL — SIGNIFICANT CHANGE UP (ref 8.4–10.5)
CHLORIDE SERPL-SCNC: 100 MMOL/L — SIGNIFICANT CHANGE UP (ref 98–107)
CHLORIDE SERPL-SCNC: 100 MMOL/L — SIGNIFICANT CHANGE UP (ref 98–107)
CO2 SERPL-SCNC: 23 MMOL/L — SIGNIFICANT CHANGE UP (ref 22–31)
CO2 SERPL-SCNC: 23 MMOL/L — SIGNIFICANT CHANGE UP (ref 22–31)
CREAT SERPL-MCNC: 1.74 MG/DL — HIGH (ref 0.5–1.3)
CREAT SERPL-MCNC: 1.74 MG/DL — HIGH (ref 0.5–1.3)
CULTURE RESULTS: NO GROWTH — SIGNIFICANT CHANGE UP
CULTURE RESULTS: NO GROWTH — SIGNIFICANT CHANGE UP
EGFR: 43 ML/MIN/1.73M2 — LOW
EGFR: 43 ML/MIN/1.73M2 — LOW
ESTIMATED AVERAGE GLUCOSE: 111 — SIGNIFICANT CHANGE UP
ESTIMATED AVERAGE GLUCOSE: 111 — SIGNIFICANT CHANGE UP
GLUCOSE SERPL-MCNC: 110 MG/DL — HIGH (ref 70–99)
GLUCOSE SERPL-MCNC: 110 MG/DL — HIGH (ref 70–99)
HCT VFR BLD CALC: 41.4 % — SIGNIFICANT CHANGE UP (ref 39–50)
HCT VFR BLD CALC: 41.4 % — SIGNIFICANT CHANGE UP (ref 39–50)
HCV AB S/CO SERPL IA: 0.07 S/CO — SIGNIFICANT CHANGE UP (ref 0–0.99)
HCV AB S/CO SERPL IA: 0.07 S/CO — SIGNIFICANT CHANGE UP (ref 0–0.99)
HCV AB SERPL-IMP: SIGNIFICANT CHANGE UP
HCV AB SERPL-IMP: SIGNIFICANT CHANGE UP
HGB BLD-MCNC: 13.5 G/DL — SIGNIFICANT CHANGE UP (ref 13–17)
HGB BLD-MCNC: 13.5 G/DL — SIGNIFICANT CHANGE UP (ref 13–17)
MCHC RBC-ENTMCNC: 28.1 PG — SIGNIFICANT CHANGE UP (ref 27–34)
MCHC RBC-ENTMCNC: 28.1 PG — SIGNIFICANT CHANGE UP (ref 27–34)
MCHC RBC-ENTMCNC: 32.6 GM/DL — SIGNIFICANT CHANGE UP (ref 32–36)
MCHC RBC-ENTMCNC: 32.6 GM/DL — SIGNIFICANT CHANGE UP (ref 32–36)
MCV RBC AUTO: 86.3 FL — SIGNIFICANT CHANGE UP (ref 80–100)
MCV RBC AUTO: 86.3 FL — SIGNIFICANT CHANGE UP (ref 80–100)
NRBC # BLD: 0 /100 WBCS — SIGNIFICANT CHANGE UP (ref 0–0)
NRBC # BLD: 0 /100 WBCS — SIGNIFICANT CHANGE UP (ref 0–0)
NRBC # FLD: 0 K/UL — SIGNIFICANT CHANGE UP (ref 0–0)
NRBC # FLD: 0 K/UL — SIGNIFICANT CHANGE UP (ref 0–0)
PLATELET # BLD AUTO: 153 K/UL — SIGNIFICANT CHANGE UP (ref 150–400)
PLATELET # BLD AUTO: 153 K/UL — SIGNIFICANT CHANGE UP (ref 150–400)
POTASSIUM SERPL-MCNC: 4.5 MMOL/L — SIGNIFICANT CHANGE UP (ref 3.5–5.3)
POTASSIUM SERPL-MCNC: 4.5 MMOL/L — SIGNIFICANT CHANGE UP (ref 3.5–5.3)
POTASSIUM SERPL-SCNC: 4.5 MMOL/L — SIGNIFICANT CHANGE UP (ref 3.5–5.3)
POTASSIUM SERPL-SCNC: 4.5 MMOL/L — SIGNIFICANT CHANGE UP (ref 3.5–5.3)
RBC # BLD: 4.8 M/UL — SIGNIFICANT CHANGE UP (ref 4.2–5.8)
RBC # BLD: 4.8 M/UL — SIGNIFICANT CHANGE UP (ref 4.2–5.8)
RBC # FLD: 13.9 % — SIGNIFICANT CHANGE UP (ref 10.3–14.5)
RBC # FLD: 13.9 % — SIGNIFICANT CHANGE UP (ref 10.3–14.5)
SODIUM SERPL-SCNC: 134 MMOL/L — LOW (ref 135–145)
SODIUM SERPL-SCNC: 134 MMOL/L — LOW (ref 135–145)
SPECIMEN SOURCE: SIGNIFICANT CHANGE UP
SPECIMEN SOURCE: SIGNIFICANT CHANGE UP
WBC # BLD: 12.24 K/UL — HIGH (ref 3.8–10.5)
WBC # BLD: 12.24 K/UL — HIGH (ref 3.8–10.5)
WBC # FLD AUTO: 12.24 K/UL — HIGH (ref 3.8–10.5)
WBC # FLD AUTO: 12.24 K/UL — HIGH (ref 3.8–10.5)

## 2023-11-11 PROCEDURE — 99232 SBSQ HOSP IP/OBS MODERATE 35: CPT

## 2023-11-11 RX ORDER — CIPROFLOXACIN LACTATE 400MG/40ML
250 VIAL (ML) INTRAVENOUS
Refills: 0 | Status: COMPLETED | OUTPATIENT
Start: 2023-11-11 | End: 2023-11-16

## 2023-11-11 RX ADMIN — Medication 250 MILLIGRAM(S): at 17:38

## 2023-11-11 RX ADMIN — FINASTERIDE 5 MILLIGRAM(S): 5 TABLET, FILM COATED ORAL at 17:38

## 2023-11-11 RX ADMIN — TAMSULOSIN HYDROCHLORIDE 0.4 MILLIGRAM(S): 0.4 CAPSULE ORAL at 21:23

## 2023-11-11 RX ADMIN — Medication 650 MILLIGRAM(S): at 13:00

## 2023-11-11 RX ADMIN — Medication 650 MILLIGRAM(S): at 05:14

## 2023-11-11 RX ADMIN — ATORVASTATIN CALCIUM 40 MILLIGRAM(S): 80 TABLET, FILM COATED ORAL at 21:23

## 2023-11-11 RX ADMIN — Medication 650 MILLIGRAM(S): at 14:00

## 2023-11-11 RX ADMIN — Medication 650 MILLIGRAM(S): at 06:14

## 2023-11-11 RX ADMIN — Medication 650 MILLIGRAM(S): at 21:24

## 2023-11-11 RX ADMIN — Medication 650 MILLIGRAM(S): at 22:24

## 2023-11-11 NOTE — DIETITIAN INITIAL EVALUATION ADULT - PROBLEM SELECTOR PLAN 3
Troponin 74--> 71  NO chest pain, EKG without ishcemic changes  likely non cardiac, due to renal dysfunction  -f.u TTE

## 2023-11-11 NOTE — DIETITIAN INITIAL EVALUATION ADULT - OTHER INFO
RD visited with patient for nutrition consult related to MST Score 2 or greater.    64 y.o male with hx of testicular cancer s/p orchectomy, lung adenocarcinoma with brain mets on lorlatinib, BPH, HLD admitted with KAMI 2/2 bilateral hydroureteronephrosis.      Patient denies reporting poor appetite or weight loss / unsure of weight loss when screened on admission.  Patient stated he has no issues with appetite and indicated that he has a stable weight in range of 145 - 150 pounds.  RD observed that patient consumed 100% of breakfast tray.  No chewing or swallowing difficulties reported. No GI distress reported i.e. nausea, vomiting, diarrhea. No food allergies noted or reported. Patient on regular diet, no restrictions. No nutritional questions or concerns at this time.

## 2023-11-11 NOTE — DIETITIAN INITIAL EVALUATION ADULT - PROBLEM SELECTOR PLAN 1
creat 2.84 from baseline 1.0  CT abdomen with bilateral hydronephrosis  Postrenal KAMI  s/p Jeronimo catheter placed by Urology with 1L of urine output  -monitor BMP after jeronimo placement for improvement of renal function  -avoid nephrotoxic agents  -start IVFs

## 2023-11-11 NOTE — DIETITIAN INITIAL EVALUATION ADULT - PERTINENT LABORATORY DATA
11-11    134<L>  |  100  |  26<H>  ----------------------------<  110<H>  4.5   |  23  |  1.74<H>    Ca    9.1      11 Nov 2023 05:28    TPro  7.5  /  Alb  4.5  /  TBili  0.2  /  DBili  x   /  AST  14  /  ALT  9   /  AlkPhos  68  11-10    A1C with Estimated Average Glucose Result: 5.5 % (11-11-23 @ 05:28)

## 2023-11-11 NOTE — PROGRESS NOTE ADULT - PROBLEM SELECTOR PLAN 1
creat 2.84 from baseline 1.0, improving to 1.7 after jeronimo placement   CT abdomen with bilateral hydronephrosis  Postrenal KAMI  s/p Jeronimo catheter placed by Urology with 1L of urine output  -Monitor BMP  -S/p IVF given concern for post-obstructive diuresis. Will dc IVF and monitor Cr in AM

## 2023-11-11 NOTE — CONSULT NOTE ADULT - SUBJECTIVE AND OBJECTIVE BOX
Reason for consult: Met lung cancer    HPI:  64 y.o male with hx of testicular cancer s/p orchectomy, lung adenocarcinoma with brain mets on lorlatinib, BPH, HLD, who presents for abnormal labs and abdominal distention.    Patient report for the last 2 days he has noticed suprapubic distention, and felt like he couldn't breath. 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 Since he has had the abdominal distention he had associated shortness of breath. Denies any chest pain, dysuria, changes in urine color or smell, fevers or chills. He states otherwise has been well, taking his medications regularly.     In the ED patient noted with creat 2.84, CT abdomen showed moderate bilateral hydroureteronephrosis, distended urinary bladder and enlarged prostate gland in the setting of obstructive uropathy. Christopher was placed by Urology.    Patient report significant improvement after Christopher placement.  (10 Nov 2023 13:38)    Heme/onc consulted on this 64 year old male with ALK+ metastatic NSCLC, follows with Dr. Nissa Luna at [facility]. The patient was diagnosed with lung adenocarcinoma in 10/2009 after being found to have RLL lesion during the surveillance for testicular ca. The patient underwent a right lower lobe resection and a mediastinal node dissection. Pathology revealed multistation microscopic metastatic adenocarcinoma involving the mediastinum. The patient was subsequently treated cisplatin and pemetrexed. This was followed by radiation therapy to the mediastinum. In 2013 found to have a small right supraclavicular lymph node. This was excised and pathology revealed recurrent adenocarcinoma. This was treated with RT The patient did well for about another 4 years when routine CT scans showed lesions in the chest and mediastinum. The patient underwent endoscopic evaluation multiple biopsies were obtained all contained adenocarcinoma. At this time his tumor was checked for markers and he was found to have an ALK mutation. The patient was thus started on crizotinib. He tolerated this well and there was resolution of his disease. On a routine CT scan of the brain the patient was subsequently found to have multiple lesions in the brain despite his asymptomatic status.  He was treated with Alecensa for about 1.5 year. Most recently he was switched to lorlatinib in 11/2021. Brain mets diagnosed in 11/2022; S/p SRS with Dr. Levine.    PAST MEDICAL & SURGICAL HISTORY:  Adenocarcinoma of lung      Lung cancer metastatic to brain      Testicular cancer      Benign prostate hyperplasia      HLD (hyperlipidemia)      S/P orchiectomy          FAMILY HISTORY:      Alochol: Denied  Smoking: Nonsmoker  Drug Use: Denied  Marital Status:         Allergies    No Known Allergies    Intolerances        MEDICATIONS  (STANDING):  atorvastatin 40 milliGRAM(s) Oral at bedtime  finasteride 5 milliGRAM(s) Oral daily  Lorlatinib 100 milliGRAM(s)   Oral <User Schedule>  sodium chloride 0.45%. 1000 milliLiter(s) (150 mL/Hr) IV Continuous <Continuous>  tamsulosin 0.4 milliGRAM(s) Oral at bedtime    MEDICATIONS  (PRN):  acetaminophen     Tablet .. 650 milliGRAM(s) Oral every 6 hours PRN Temp greater or equal to 38C (100.4F), Mild Pain (1 - 3), Moderate Pain (4 - 6)  aluminum hydroxide/magnesium hydroxide/simethicone Suspension 30 milliLiter(s) Oral every 4 hours PRN Dyspepsia  melatonin 3 milliGRAM(s) Oral at bedtime PRN Insomnia  ondansetron Injectable 4 milliGRAM(s) IV Push every 8 hours PRN Nausea and/or Vomiting      ROS  No fever, sweats, chills  No epistaxis, HA, sore throat  No CP, SOB, cough, sputum  No n/v/d, abd pain, melena, hematochezia  No edema  No rash  No anxiety  No back pain, joint pain  No bleeding, bruising  No dysuria, hematuria    T(C): 36.7 (11-11-23 @ 05:10), Max: 37.6 (11-10-23 @ 21:15)  HR: 112 (11-11-23 @ 05:10) (96 - 112)  BP: 125/74 (11-11-23 @ 05:10) (124/77 - 145/95)  RR: 17 (11-11-23 @ 05:10) (17 - 22)  SpO2: 95% (11-11-23 @ 05:10) (93% - 99%)  Wt(kg): --    PE  NAD  Awake, alert  Anicteric, MMM  RRR  CTAB  Abd soft, NT, ND  No c/c/e  No rash grossly  FROM                          13.5   12.24 )-----------( 153      ( 11 Nov 2023 05:28 )             41.4       11-11    134<L>  |  100  |  26<H>  ----------------------------<  110<H>  4.5   |  23  |  1.74<H>    Ca    9.1      11 Nov 2023 05:28    TPro  7.5  /  Alb  4.5  /  TBili  0.2  /  DBili  x   /  AST  14  /  ALT  9   /  AlkPhos  68  11-10

## 2023-11-11 NOTE — DIETITIAN INITIAL EVALUATION ADULT - PROBLEM SELECTOR PLAN 2
Moderate bilateral hydroureteronephrosis, distended urinary bladder and enlarged   prostate gland.  s/p Cefepime in the ED, U/A without significant pyuria, no bacteria, will hold off on further Abx   -s/p Christopher catheter placement by Urology  -now with gross hematuria- clearing  -c/w home tamsulosin  -add proscar per Urology  - Monitor for post obstructive diuresis   - Maintain strict intake and output   -  1/2 NS IVFs replacement   - Follow up urine culture (U/A negative)  - Keep Christopher for at least 5 days per Urology  -Patient will follow-up with established outpatient Urologist, hs an appointment next Monday 11/13

## 2023-11-11 NOTE — DIETITIAN INITIAL EVALUATION ADULT - PERTINENT MEDS FT
MEDICATIONS  (STANDING):  atorvastatin 40 milliGRAM(s) Oral at bedtime  ciprofloxacin     Tablet 250 milliGRAM(s) Oral two times a day  finasteride 5 milliGRAM(s) Oral daily  tamsulosin 0.4 milliGRAM(s) Oral at bedtime    MEDICATIONS  (PRN):  acetaminophen     Tablet .. 650 milliGRAM(s) Oral every 6 hours PRN Temp greater or equal to 38C (100.4F), Mild Pain (1 - 3), Moderate Pain (4 - 6)  aluminum hydroxide/magnesium hydroxide/simethicone Suspension 30 milliLiter(s) Oral every 4 hours PRN Dyspepsia  melatonin 3 milliGRAM(s) Oral at bedtime PRN Insomnia  ondansetron Injectable 4 milliGRAM(s) IV Push every 8 hours PRN Nausea and/or Vomiting

## 2023-11-11 NOTE — DIETITIAN INITIAL EVALUATION ADULT - PROBLEM SELECTOR PLAN 4
diagnosed in 2009 s/p resection followed by CRT with subsequent metastatic spread currently on targeted therapy with Lorbrena (lorlatinib)  Follows outpatient with Dr. Luna  -continue home lorlatinib 100mg daily (patient brought home medication), need onc consult per pharmacy (requested)

## 2023-11-12 DIAGNOSIS — I26.09 OTHER PULMONARY EMBOLISM WITH ACUTE COR PULMONALE: ICD-10-CM

## 2023-11-12 LAB
ANION GAP SERPL CALC-SCNC: 14 MMOL/L — SIGNIFICANT CHANGE UP (ref 7–14)
ANION GAP SERPL CALC-SCNC: 14 MMOL/L — SIGNIFICANT CHANGE UP (ref 7–14)
APTT BLD: 29.9 SEC — SIGNIFICANT CHANGE UP (ref 24.5–35.6)
APTT BLD: 29.9 SEC — SIGNIFICANT CHANGE UP (ref 24.5–35.6)
APTT BLD: 37.9 SEC — HIGH (ref 24.5–35.6)
APTT BLD: 37.9 SEC — HIGH (ref 24.5–35.6)
BASOPHILS # BLD AUTO: 0.07 K/UL — SIGNIFICANT CHANGE UP (ref 0–0.2)
BASOPHILS # BLD AUTO: 0.07 K/UL — SIGNIFICANT CHANGE UP (ref 0–0.2)
BASOPHILS NFR BLD AUTO: 0.6 % — SIGNIFICANT CHANGE UP (ref 0–2)
BASOPHILS NFR BLD AUTO: 0.6 % — SIGNIFICANT CHANGE UP (ref 0–2)
BUN SERPL-MCNC: 21 MG/DL — SIGNIFICANT CHANGE UP (ref 7–23)
BUN SERPL-MCNC: 21 MG/DL — SIGNIFICANT CHANGE UP (ref 7–23)
CALCIUM SERPL-MCNC: 9.8 MG/DL — SIGNIFICANT CHANGE UP (ref 8.4–10.5)
CALCIUM SERPL-MCNC: 9.8 MG/DL — SIGNIFICANT CHANGE UP (ref 8.4–10.5)
CHLORIDE SERPL-SCNC: 99 MMOL/L — SIGNIFICANT CHANGE UP (ref 98–107)
CHLORIDE SERPL-SCNC: 99 MMOL/L — SIGNIFICANT CHANGE UP (ref 98–107)
CK MB BLD-MCNC: 2.9 % — HIGH (ref 0–2.5)
CK MB BLD-MCNC: 2.9 % — HIGH (ref 0–2.5)
CK MB CFR SERPL CALC: 1.7 NG/ML — SIGNIFICANT CHANGE UP
CK MB CFR SERPL CALC: 1.7 NG/ML — SIGNIFICANT CHANGE UP
CK SERPL-CCNC: 59 U/L — SIGNIFICANT CHANGE UP (ref 30–200)
CK SERPL-CCNC: 59 U/L — SIGNIFICANT CHANGE UP (ref 30–200)
CO2 SERPL-SCNC: 27 MMOL/L — SIGNIFICANT CHANGE UP (ref 22–31)
CO2 SERPL-SCNC: 27 MMOL/L — SIGNIFICANT CHANGE UP (ref 22–31)
CREAT SERPL-MCNC: 1.58 MG/DL — HIGH (ref 0.5–1.3)
CREAT SERPL-MCNC: 1.58 MG/DL — HIGH (ref 0.5–1.3)
D DIMER BLD IA.RAPID-MCNC: 5474 NG/ML DDU — HIGH
D DIMER BLD IA.RAPID-MCNC: 5474 NG/ML DDU — HIGH
EGFR: 49 ML/MIN/1.73M2 — LOW
EGFR: 49 ML/MIN/1.73M2 — LOW
EOSINOPHIL # BLD AUTO: 0.57 K/UL — HIGH (ref 0–0.5)
EOSINOPHIL # BLD AUTO: 0.57 K/UL — HIGH (ref 0–0.5)
EOSINOPHIL NFR BLD AUTO: 4.5 % — SIGNIFICANT CHANGE UP (ref 0–6)
EOSINOPHIL NFR BLD AUTO: 4.5 % — SIGNIFICANT CHANGE UP (ref 0–6)
GLUCOSE SERPL-MCNC: 88 MG/DL — SIGNIFICANT CHANGE UP (ref 70–99)
GLUCOSE SERPL-MCNC: 88 MG/DL — SIGNIFICANT CHANGE UP (ref 70–99)
HCT VFR BLD CALC: 38.2 % — LOW (ref 39–50)
HCT VFR BLD CALC: 38.2 % — LOW (ref 39–50)
HCT VFR BLD CALC: 45.1 % — SIGNIFICANT CHANGE UP (ref 39–50)
HCT VFR BLD CALC: 45.1 % — SIGNIFICANT CHANGE UP (ref 39–50)
HGB BLD-MCNC: 12.5 G/DL — LOW (ref 13–17)
HGB BLD-MCNC: 12.5 G/DL — LOW (ref 13–17)
HGB BLD-MCNC: 14.3 G/DL — SIGNIFICANT CHANGE UP (ref 13–17)
HGB BLD-MCNC: 14.3 G/DL — SIGNIFICANT CHANGE UP (ref 13–17)
IANC: 8.39 K/UL — HIGH (ref 1.8–7.4)
IANC: 8.39 K/UL — HIGH (ref 1.8–7.4)
IMM GRANULOCYTES NFR BLD AUTO: 0.7 % — SIGNIFICANT CHANGE UP (ref 0–0.9)
IMM GRANULOCYTES NFR BLD AUTO: 0.7 % — SIGNIFICANT CHANGE UP (ref 0–0.9)
INR BLD: 1.13 RATIO — SIGNIFICANT CHANGE UP (ref 0.85–1.18)
INR BLD: 1.13 RATIO — SIGNIFICANT CHANGE UP (ref 0.85–1.18)
LYMPHOCYTES # BLD AUTO: 17.8 % — SIGNIFICANT CHANGE UP (ref 13–44)
LYMPHOCYTES # BLD AUTO: 17.8 % — SIGNIFICANT CHANGE UP (ref 13–44)
LYMPHOCYTES # BLD AUTO: 2.24 K/UL — SIGNIFICANT CHANGE UP (ref 1–3.3)
LYMPHOCYTES # BLD AUTO: 2.24 K/UL — SIGNIFICANT CHANGE UP (ref 1–3.3)
MAGNESIUM SERPL-MCNC: 2.1 MG/DL — SIGNIFICANT CHANGE UP (ref 1.6–2.6)
MAGNESIUM SERPL-MCNC: 2.1 MG/DL — SIGNIFICANT CHANGE UP (ref 1.6–2.6)
MCHC RBC-ENTMCNC: 28.1 PG — SIGNIFICANT CHANGE UP (ref 27–34)
MCHC RBC-ENTMCNC: 28.1 PG — SIGNIFICANT CHANGE UP (ref 27–34)
MCHC RBC-ENTMCNC: 28.7 PG — SIGNIFICANT CHANGE UP (ref 27–34)
MCHC RBC-ENTMCNC: 28.7 PG — SIGNIFICANT CHANGE UP (ref 27–34)
MCHC RBC-ENTMCNC: 31.7 GM/DL — LOW (ref 32–36)
MCHC RBC-ENTMCNC: 31.7 GM/DL — LOW (ref 32–36)
MCHC RBC-ENTMCNC: 32.7 GM/DL — SIGNIFICANT CHANGE UP (ref 32–36)
MCHC RBC-ENTMCNC: 32.7 GM/DL — SIGNIFICANT CHANGE UP (ref 32–36)
MCV RBC AUTO: 87.6 FL — SIGNIFICANT CHANGE UP (ref 80–100)
MCV RBC AUTO: 87.6 FL — SIGNIFICANT CHANGE UP (ref 80–100)
MCV RBC AUTO: 88.6 FL — SIGNIFICANT CHANGE UP (ref 80–100)
MCV RBC AUTO: 88.6 FL — SIGNIFICANT CHANGE UP (ref 80–100)
MONOCYTES # BLD AUTO: 1.25 K/UL — HIGH (ref 0–0.9)
MONOCYTES # BLD AUTO: 1.25 K/UL — HIGH (ref 0–0.9)
MONOCYTES NFR BLD AUTO: 9.9 % — SIGNIFICANT CHANGE UP (ref 2–14)
MONOCYTES NFR BLD AUTO: 9.9 % — SIGNIFICANT CHANGE UP (ref 2–14)
NEUTROPHILS # BLD AUTO: 8.39 K/UL — HIGH (ref 1.8–7.4)
NEUTROPHILS # BLD AUTO: 8.39 K/UL — HIGH (ref 1.8–7.4)
NEUTROPHILS NFR BLD AUTO: 66.5 % — SIGNIFICANT CHANGE UP (ref 43–77)
NEUTROPHILS NFR BLD AUTO: 66.5 % — SIGNIFICANT CHANGE UP (ref 43–77)
NRBC # BLD: 0 /100 WBCS — SIGNIFICANT CHANGE UP (ref 0–0)
NRBC # FLD: 0 K/UL — SIGNIFICANT CHANGE UP (ref 0–0)
NT-PROBNP SERPL-SCNC: 2844 PG/ML — HIGH
NT-PROBNP SERPL-SCNC: 2844 PG/ML — HIGH
PHOSPHATE SERPL-MCNC: 3.1 MG/DL — SIGNIFICANT CHANGE UP (ref 2.5–4.5)
PHOSPHATE SERPL-MCNC: 3.1 MG/DL — SIGNIFICANT CHANGE UP (ref 2.5–4.5)
PLATELET # BLD AUTO: 130 K/UL — LOW (ref 150–400)
PLATELET # BLD AUTO: 130 K/UL — LOW (ref 150–400)
PLATELET # BLD AUTO: 165 K/UL — SIGNIFICANT CHANGE UP (ref 150–400)
PLATELET # BLD AUTO: 165 K/UL — SIGNIFICANT CHANGE UP (ref 150–400)
POTASSIUM SERPL-MCNC: 4.1 MMOL/L — SIGNIFICANT CHANGE UP (ref 3.5–5.3)
POTASSIUM SERPL-MCNC: 4.1 MMOL/L — SIGNIFICANT CHANGE UP (ref 3.5–5.3)
POTASSIUM SERPL-SCNC: 4.1 MMOL/L — SIGNIFICANT CHANGE UP (ref 3.5–5.3)
POTASSIUM SERPL-SCNC: 4.1 MMOL/L — SIGNIFICANT CHANGE UP (ref 3.5–5.3)
PROCALCITONIN SERPL-MCNC: 0.11 NG/ML — HIGH (ref 0.02–0.1)
PROCALCITONIN SERPL-MCNC: 0.11 NG/ML — HIGH (ref 0.02–0.1)
PROTHROM AB SERPL-ACNC: 12.6 SEC — SIGNIFICANT CHANGE UP (ref 9.5–13)
PROTHROM AB SERPL-ACNC: 12.6 SEC — SIGNIFICANT CHANGE UP (ref 9.5–13)
RBC # BLD: 4.36 M/UL — SIGNIFICANT CHANGE UP (ref 4.2–5.8)
RBC # BLD: 4.36 M/UL — SIGNIFICANT CHANGE UP (ref 4.2–5.8)
RBC # BLD: 5.09 M/UL — SIGNIFICANT CHANGE UP (ref 4.2–5.8)
RBC # BLD: 5.09 M/UL — SIGNIFICANT CHANGE UP (ref 4.2–5.8)
RBC # FLD: 13.5 % — SIGNIFICANT CHANGE UP (ref 10.3–14.5)
RBC # FLD: 13.5 % — SIGNIFICANT CHANGE UP (ref 10.3–14.5)
RBC # FLD: 13.8 % — SIGNIFICANT CHANGE UP (ref 10.3–14.5)
RBC # FLD: 13.8 % — SIGNIFICANT CHANGE UP (ref 10.3–14.5)
SODIUM SERPL-SCNC: 140 MMOL/L — SIGNIFICANT CHANGE UP (ref 135–145)
SODIUM SERPL-SCNC: 140 MMOL/L — SIGNIFICANT CHANGE UP (ref 135–145)
TROPONIN T, HIGH SENSITIVITY RESULT: 40 NG/L — SIGNIFICANT CHANGE UP
TROPONIN T, HIGH SENSITIVITY RESULT: 40 NG/L — SIGNIFICANT CHANGE UP
WBC # BLD: 10.37 K/UL — SIGNIFICANT CHANGE UP (ref 3.8–10.5)
WBC # BLD: 10.37 K/UL — SIGNIFICANT CHANGE UP (ref 3.8–10.5)
WBC # BLD: 12.61 K/UL — HIGH (ref 3.8–10.5)
WBC # BLD: 12.61 K/UL — HIGH (ref 3.8–10.5)
WBC # FLD AUTO: 10.37 K/UL — SIGNIFICANT CHANGE UP (ref 3.8–10.5)
WBC # FLD AUTO: 10.37 K/UL — SIGNIFICANT CHANGE UP (ref 3.8–10.5)
WBC # FLD AUTO: 12.61 K/UL — HIGH (ref 3.8–10.5)
WBC # FLD AUTO: 12.61 K/UL — HIGH (ref 3.8–10.5)

## 2023-11-12 PROCEDURE — 93010 ELECTROCARDIOGRAM REPORT: CPT

## 2023-11-12 PROCEDURE — 71045 X-RAY EXAM CHEST 1 VIEW: CPT | Mod: 26

## 2023-11-12 PROCEDURE — 71275 CT ANGIOGRAPHY CHEST: CPT | Mod: 26

## 2023-11-12 PROCEDURE — 99233 SBSQ HOSP IP/OBS HIGH 50: CPT

## 2023-11-12 RX ORDER — HEPARIN SODIUM 5000 [USP'U]/ML
5000 INJECTION INTRAVENOUS; SUBCUTANEOUS EVERY 8 HOURS
Refills: 0 | Status: DISCONTINUED | OUTPATIENT
Start: 2023-11-12 | End: 2023-11-12

## 2023-11-12 RX ORDER — HEPARIN SODIUM 5000 [USP'U]/ML
2500 INJECTION INTRAVENOUS; SUBCUTANEOUS EVERY 6 HOURS
Refills: 0 | Status: DISCONTINUED | OUTPATIENT
Start: 2023-11-12 | End: 2023-11-16

## 2023-11-12 RX ORDER — HEPARIN SODIUM 5000 [USP'U]/ML
INJECTION INTRAVENOUS; SUBCUTANEOUS
Qty: 25000 | Refills: 0 | Status: DISCONTINUED | OUTPATIENT
Start: 2023-11-12 | End: 2023-11-16

## 2023-11-12 RX ORDER — SODIUM CHLORIDE 9 MG/ML
1000 INJECTION INTRAMUSCULAR; INTRAVENOUS; SUBCUTANEOUS
Refills: 0 | Status: COMPLETED | OUTPATIENT
Start: 2023-11-12 | End: 2023-11-13

## 2023-11-12 RX ORDER — HEPARIN SODIUM 5000 [USP'U]/ML
5500 INJECTION INTRAVENOUS; SUBCUTANEOUS EVERY 6 HOURS
Refills: 0 | Status: DISCONTINUED | OUTPATIENT
Start: 2023-11-12 | End: 2023-11-16

## 2023-11-12 RX ADMIN — FINASTERIDE 5 MILLIGRAM(S): 5 TABLET, FILM COATED ORAL at 13:03

## 2023-11-12 RX ADMIN — HEPARIN SODIUM 5500 UNIT(S): 5000 INJECTION INTRAVENOUS; SUBCUTANEOUS at 20:03

## 2023-11-12 RX ADMIN — SODIUM CHLORIDE 75 MILLILITER(S): 9 INJECTION INTRAMUSCULAR; INTRAVENOUS; SUBCUTANEOUS at 10:46

## 2023-11-12 RX ADMIN — TAMSULOSIN HYDROCHLORIDE 0.4 MILLIGRAM(S): 0.4 CAPSULE ORAL at 20:43

## 2023-11-12 RX ADMIN — HEPARIN SODIUM 1200 UNIT(S)/HR: 5000 INJECTION INTRAVENOUS; SUBCUTANEOUS at 19:34

## 2023-11-12 RX ADMIN — SODIUM CHLORIDE 75 MILLILITER(S): 9 INJECTION INTRAMUSCULAR; INTRAVENOUS; SUBCUTANEOUS at 19:33

## 2023-11-12 RX ADMIN — HEPARIN SODIUM 1200 UNIT(S)/HR: 5000 INJECTION INTRAVENOUS; SUBCUTANEOUS at 13:03

## 2023-11-12 RX ADMIN — Medication 250 MILLIGRAM(S): at 05:59

## 2023-11-12 RX ADMIN — Medication 250 MILLIGRAM(S): at 17:02

## 2023-11-12 RX ADMIN — ATORVASTATIN CALCIUM 40 MILLIGRAM(S): 80 TABLET, FILM COATED ORAL at 20:44

## 2023-11-12 RX ADMIN — HEPARIN SODIUM 1500 UNIT(S)/HR: 5000 INJECTION INTRAVENOUS; SUBCUTANEOUS at 20:02

## 2023-11-12 NOTE — CONSULT NOTE ADULT - SUBJECTIVE AND OBJECTIVE BOX
CC:  Patient is a 63y old  Male who presents with a chief complaint of KAMI (12 Nov 2023 14:46)    HPI:  64 y.o male with hx of testicular cancer s/p orchectomy, lung adenocarcinoma with brain mets on lorlatinib, BPH, HLD, who presents for abnormal labs and abdominal distention. Patient report for the last 2 days he has noticed suprapubic distention, and felt like he couldn't breath. State as baseline he exercises and walks [place] whole [place] without issues, without shortness of breath or chest pain. Since he has had the abdominal distention he had associated shortness of breath. Denies any chest pain, dysuria, changes in urine color or smell, fevers or chills. He states otherwise has been well, taking his medications regularly.   In the ED patient noted with creat 2.84, CT abdomen showed moderate bilateral hydroureteronephrosis, distended urinary bladder and enlarged prostate gland in the setting of obstructive uropathy. Christopher was placed by Urology. Patient report significant improvement after Christopher placement.  (10 Nov 2023 13:38)  Cardiology consulted for new on set PE.    Allergies    No Known Allergies    Intolerances    	    MEDICATIONS    heparin   Injectable 5500 Unit(s) IV Push every 6 hours PRN  heparin   Injectable 2500 Unit(s) IV Push every 6 hours PRN  heparin  Infusion.  Unit(s)/Hr IV Continuous <Continuous>    ciprofloxacin     Tablet 250 milliGRAM(s) Oral two times a day      acetaminophen     Tablet .. 650 milliGRAM(s) Oral every 6 hours PRN  melatonin 3 milliGRAM(s) Oral at bedtime PRN  ondansetron Injectable 4 milliGRAM(s) IV Push every 8 hours PRN    aluminum hydroxide/magnesium hydroxide/simethicone Suspension 30 milliLiter(s) Oral every 4 hours PRN    atorvastatin 40 milliGRAM(s) Oral at bedtime  finasteride 5 milliGRAM(s) Oral daily    sodium chloride 0.9%. 1000 milliLiter(s) IV Continuous <Continuous>  tamsulosin 0.4 milliGRAM(s) Oral at bedtime            PAST MEDICAL & SURGICAL HISTORY:  Adenocarcinoma of lung      Lung cancer metastatic to brain      Testicular cancer      Benign prostate hyperplasia      HLD (hyperlipidemia)      S/P orchiectomy          FAMILY HISTORY:      SOCIAL HISTORY    Marital Status:   Occupation:   Lives with:     SUBSTANCE USE  Tobacco Usage:  ( ) None ( ) never smoked   ( ) former smoker  ( ) current smoker; Packs per day:   Alcohol Usage: ( ) none  ( ) occasional ( ) 2-3 times a week ( ) daily; Last drink:   Recreational drugs ( ) None    CONSTITUTIONAL: No fevers, No chills, No fatigue, No weight gain  EYES: No vision changes   ENT: No congestion, No ear pain, No sore throat.  NECK: No pain, No stiffness  RESPIRATORY: No shortness of breath, No cough, No wheezing, No hemoptysis  CARDIOVASCULAR: No chest pain. No palpitations, No ZEE, No orthopnea, No paroxysmal nocturnal dyspnea, No pleuritic pain  GASTROINTESTINAL: No abdominal pain, No nausea, No vomiting, No hematemesis, No diarrhea No constipation. No melena  GENITOURINARY: No dysuria, No frequency, No incontinence, No hematuria  NEUROLOGICAL: No dizziness, No lightheadedness, No syncope, No LOC, No headache, No numbness or weakness  MUSCULOSKELETAL: No Edema, No joint pain, No joint swelling.  PSYCHIATRIC: No anxiety, No depression  DERMATOLOGY: No diaphoresis. No itching, No rashes, No pressure ulcers  HEME/LYMPH: No easy bruising, or bleeding gums    All other review of systems is negative unless indicated above.    VITAL SIGNS  T(C): 37 (11-12-23 @ 08:30), Max: 37.1 (11-11-23 @ 21:07)  HR: 128 (11-12-23 @ 08:30) (121 - 128)  BP: 116/73 (11-12-23 @ 08:30) (116/73 - 125/81)  RR: 18 (11-12-23 @ 08:30) (17 - 18)  SpO2: 94% (11-12-23 @ 08:30) (80% - 96%)  Wt(kg): --    Appearance: NAD, no distress  HEENT: Moist Mucous Membranes, Anicteric, PERRL, EOMI  Cardiovascular: Regular rate and rhythm, Normal S1 S2, No JVD, No murmurs  Respiratory: Lungs clear to auscultation. No rales, No rhonchi, No wheezing. No tenderness to palpation  Gastrointestinal:  Soft, Non-tender, + BS  Neurologic: Non-focal, A&Ox3  Skin: Warm and dry, No rashes, No ecchymosis, No cyanosis  Musculoskeletal: No clubbing, No cyanosis, No edema  Vascular: Peripheral pulses palpable 2+ bilaterally  Psychiatry: Mood & affect appropriate      	    		        I&O's Summary    11 Nov 2023 07:01  -  12 Nov 2023 07:00  --------------------------------------------------------  IN: 550 mL / OUT: 2000 mL / NET: -1450 mL        LABORATORY VALUES	 	                          14.3   12.61 )-----------( 165      ( 12 Nov 2023 05:16 )             45.1       11-12    140  |  99  |  21  ----------------------------<  88  4.1   |  27  |  1.58<H>  11-11    134<L>  |  100  |  26<H>  ----------------------------<  110<H>  4.5   |  23  |  1.74<H>    Ca    9.8      12 Nov 2023 05:16  Ca    9.1      11 Nov 2023 05:28  Phos  3.1     11-12  Mg     2.10     11-12        Prothrombin Time, Plasma: 12.6 sec (11-12 @ 09:44)      CARDIAC MARKERS:  Creatine Kinase, Serum: 59 U/L (11-12 @ 05:16)            Blood Gas Venous - Lactate: 1.0 mmol/L (11-10 @ 02:00)            Urinalysis Basic - ( 12 Nov 2023 05:16 )    Color: x / Appearance: x / SG: x / pH: x  Gluc: 88 mg/dL / Ketone: x  / Bili: x / Urobili: x   Blood: x / Protein: x / Nitrite: x   Leuk Esterase: x / RBC: x / WBC x   Sq Epi: x / Non Sq Epi: x / Bacteria: x      CAPILLARY BLOOD GLUCOSE          11-10 @ 02:34  229E Corona Virus --  Adenovirus NotDetec  Bordetella pertussis NotDetec  Chlamydia pneumoniae NotDetec  Entero/Rhino Virus NotDetec  HKU1 Coronavirus --  hMPV NotDetec  Influenza A NotDetec  Influenza AH1 --  Influenza AH1 2009 --  Influenza AH3 --  [facility]  Mycoplasma pneumoniae [facility]  NL63 Coronavirus --  OC43 Corornavirus --  Parainfluenza 1 [facility]  Parainfluenza 2 [facility]          TELEMETRY: 	    ECG:  	  RADIOLOGY:  OTHER: 	    PREVIOUS DIAGNOSTIC TESTING:    [ ] Echocardiogram:  [ ] Catheterization:  [ ] Stress Test:  	    ASSESSMENT AND PLAN      PLAN    	      -house cardiology will follow, call # [phone] for questions.    Case discussed with   Attending attestation to follow.               CC:  Patient is a 63y old  Male who presents with a chief complaint of KAMI (12 Nov 2023 14:46)    HPI:  64 y.o male with hx of testicular cancer s/p orchectomy, lung adenocarcinoma with brain mets on lorlatinib, BPH, HLD, who presents for abnormal labs and abdominal distention. Patient report for the last 2 days he has noticed suprapubic distention, and felt like he couldn't breath. State as baseline he exercises and [place] [employer] without issues, without shortness of breath or chest pain. Since he has had the abdominal distention he had associated shortness of breath. Denies any chest pain, dysuria, changes in urine color or smell, fevers or chills. He states otherwise has been well, taking his medications regularly.   In the ED patient noted with creat 2.84, CT abdomen showed moderate bilateral hydroureteronephrosis, distended urinary bladder and enlarged prostate gland in the setting of obstructive uropathy. Christopher was placed by Urology. Patient report significant improvement after Christopher placement.  (10 Nov 2023 13:38)  This morning, pt reports he was straining self to have bowel movement and subsequently had SOB and lightheaded. Pt found to have hypoxia in 70's and supplemental oxygen applied, and went to get CT chest. CT shows Extensive bilateral pulmonary arterial pulmonary emboli with involvement of all lobes as detailed above. Evidence of right heart strain with RV/LV ratio of 2.0. Cardiology consulted for new on set PE.    Allergies    No Known Allergies    Intolerances    	    MEDICATIONS    heparin   Injectable 5500 Unit(s) IV Push every 6 hours PRN  heparin   Injectable 2500 Unit(s) IV Push every 6 hours PRN  heparin  Infusion.  Unit(s)/Hr IV Continuous <Continuous>  ciprofloxacin     Tablet 250 milliGRAM(s) Oral two times a day  acetaminophen     Tablet .. 650 milliGRAM(s) Oral every 6 hours PRN  melatonin 3 milliGRAM(s) Oral at bedtime PRN  ondansetron Injectable 4 milliGRAM(s) IV Push every 8 hours PRN  aluminum hydroxide/magnesium hydroxide/simethicone Suspension 30 milliLiter(s) Oral every 4 hours PRN  atorvastatin 40 milliGRAM(s) Oral at bedtime  finasteride 5 milliGRAM(s) Oral daily  sodium chloride 0.9%. 1000 milliLiter(s) IV Continuous <Continuous>  tamsulosin 0.4 milliGRAM(s) Oral at bedtime            PAST MEDICAL & SURGICAL HISTORY:  Adenocarcinoma of lung      Lung cancer metastatic to brain      Testicular cancer      Benign prostate hyperplasia      HLD (hyperlipidemia)      S/P orchiectomy          FAMILY HISTORY:  CA, PE    SOCIAL HISTORY    Marital Status:   Occupation:   Lives with: wife    SUBSTANCE USE  Tobacco Usage:  ( ) None ( ) never smoked   ( ) former smoker  ( ) current smoker; Packs per day:   Alcohol Usage: ( ) none  ( ) occasional ( ) 2-3 times a week ( ) daily; Last drink:   Recreational drugs ( ) None    CONSTITUTIONAL: No fevers, No chills, No fatigue, No weight gain  EYES: No vision changes   ENT: No congestion, No ear pain, No sore throat.  NECK: No pain, No stiffness  RESPIRATORY: + shortness of breath, No cough, No wheezing, No hemoptysis  CARDIOVASCULAR: No chest pain. No palpitations, No ZEE, No orthopnea, No paroxysmal nocturnal dyspnea, No pleuritic pain  GASTROINTESTINAL: No abdominal pain, No nausea, No vomiting, No hematemesis, No diarrhea No constipation. No melena  GENITOURINARY: No dysuria, No frequency, No incontinence, No hematuria  NEUROLOGICAL: No dizziness, No lightheadedness, No syncope, No LOC, No headache, No numbness or weakness  MUSCULOSKELETAL: No Edema, No joint pain, No joint swelling.  PSYCHIATRIC: No anxiety, No depression  DERMATOLOGY: No diaphoresis. No itching, No rashes, No pressure ulcers  HEME/LYMPH: No easy bruising, or bleeding gums    All other review of systems is negative unless indicated above.    VITAL SIGNS  T(C): 37 (11-12-23 @ 08:30), Max: 37.1 (11-11-23 @ 21:07)  HR: 128 (11-12-23 @ 08:30) (121 - 128)  BP: 116/73 (11-12-23 @ 08:30) (116/73 - 125/81)  RR: 18 (11-12-23 @ 08:30) (17 - 18)  SpO2: 94% (11-12-23 @ 08:30) (80% - 96%)  Wt(kg): --    Appearance: NAD, no distress  HEENT: Moist Mucous Membranes, Anicteric.  Cardiovascular: Regular rate and rhythm, Normal S1 S2, No JVD, No murmurs  Respiratory: Lungs clear to auscultation. diminished on right LL, No rales, No rhonchi, No wheezing. No tenderness to palpation  Gastrointestinal:  Soft, Non-tender, + BS  Neurologic: Non-focal, A&Ox3  Skin: Warm and dry, No rashes, No ecchymosis, No cyanosis  Musculoskeletal: No clubbing, No cyanosis, No edema  Vascular: Peripheral pulses palpable 2+ bilaterally  Psychiatry: Mood & affect appropriate      	    		        I&O's Summary    11 Nov 2023 07:01  -  12 Nov 2023 07:00  --------------------------------------------------------  IN: 550 mL / OUT: 2000 mL / NET: -1450 mL        LABORATORY VALUES	 	                          14.3   12.61 )-----------( 165      ( 12 Nov 2023 05:16 )             45.1       11-12    140  |  99  |  21  ----------------------------<  88  4.1   |  27  |  1.58<H>  11-11    134<L>  |  100  |  26<H>  ----------------------------<  110<H>  4.5   |  23  |  1.74<H>    Ca    9.8      12 Nov 2023 05:16  Ca    9.1      11 Nov 2023 05:28  Phos  3.1     11-12  Mg     2.10     11-12        Prothrombin Time, Plasma: 12.6 sec (11-12 @ 09:44)      CARDIAC MARKERS:  Creatine Kinase, Serum: 59 U/L (11-12 @ 05:16)    troponin 40          Blood Gas Venous - Lactate: 1.0 mmol/L (11-10 @ 02:00)      Urinalysis Basic - ( 12 Nov 2023 05:16 )    Color: x / Appearance: x / SG: x / pH: x  Gluc: 88 mg/dL / Ketone: x  / Bili: x / Urobili: x   Blood: x / Protein: x / Nitrite: x   Leuk Esterase: x / RBC: x / WBC x   Sq Epi: x / Non Sq Epi: x / Bacteria: x      CAPILLARY BLOOD GLUCOSE          11-10 @ 02:34  229E Corona Virus --  Adenovirus NotDetec  Bordetella pertussis NotDetec  Chlamydia pneumoniae NotDetec  Entero/Rhino Virus NotDetec  HKU1 Coronavirus --  hMPV NotDetec  Influenza A NotDetec  Influenza AH1 --  Influenza AH1 2009 --  Influenza AH3 --  Influenza B NotDetec  Mycoplasma pneumoniae NotDetec  NL63 Coronavirus --  OC43 Corornavirus --  Parainfluenza 1 NotDetec  Parainfluenza 2 NotDetec        	    ECG:  ST , TWI in III, aVF    RADIOLOGY:  < from: CT Angio Chest PE Protocol w/ IV Cont (11.12.23 @ 12:02) >  CT Angiography of the Chest.  Sagittal and coronal reformats were performed as well as 3D (MIP)   reconstructions.    FINDINGS:    LUNGS AND AIRWAYS: Patent central airways.  Status post right lower   lobectomy. Stable scarring within the right apex. Stable 4 mm right   apical nodule.  PLEURA: No pleural effusion.  MEDIASTINUM AND MAYRA: No lymphadenopathy.  VESSELS: Pulmonary arterial emboli within the distal aspects of the   bilateral central pulmonary arteries extending into the bilateral upper   lobe and the left lower lobe lobar, segmental and subsegmental airways   with involvement of the interlobar pulmonary arteries. Pulmonary embolus   extends into the right middle lobar pulmonary artery. Evidence of right   heart strain with RV/LV ratio of 2.0 with associated flattening of the   interventricular septum.  HEART: Small pericardial effusion.  CHEST WALL AND LOWER NECK: Bilateral gynecomastia.  VISUALIZED UPPER ABDOMEN: Hepatic cyst and subcentimeter hypodensities   which are too small to characterize elevation of the right hemidiaphragm   as on the prior study. Cholelithiasis.  BONES: Degenerative changes. Chronic right rib deformities as on the   prior study.    IMPRESSION:  Extensive bilateral pulmonary arterial pulmonary emboli with involvement   of all lobes as detailed above. Evidence of right heart strain with RV/LV   ratio of 2.0.    CARMEN Restrepo was informed of these results by Fletcher Mohan MD with   read back at about 12:40 PM on 11/12/2023    --- End of Report ---          FLETCHER MOHAN MD; Resident Radiologist  This document has been electronically signed.  KYRIE MARIE MD; Attending Radiologist    < end of copied text >  	    	 CC:  Patient is a 63y old  Male who presents with a chief complaint of KAMI (12 Nov 2023 14:46)    HPI:  64 y.o male with hx of testicular cancer s/p orchectomy, lung adenocarcinoma with brain mets on lorlatinib, BPH, HLD, who presents for abnormal labs and abdominal distention. Patient report for the last 2 days he has noticed suprapubic distention, and felt like he couldn't breath. State as baseline he exercises and walks the whole [place] without issues, without shortness of breath or chest pain. Since he has had the abdominal distention he had associated shortness of breath. Denies any chest pain, dysuria, changes in urine color or smell, fevers or chills. He states otherwise has been well, taking his medications regularly.   In the ED patient noted with creat 2.84, CT abdomen showed moderate bilateral hydroureteronephrosis, distended urinary bladder and enlarged prostate gland in the setting of obstructive uropathy. Christopher was placed by Urology. Patient report significant improvement after Christopher placement.  (10 Nov 2023 13:38)  This morning, pt reports he was straining self to have a bowel movement and subsequently had SOB and lightheadedness. Pt found to have hypoxia in 70's and supplemental oxygen applied, and went to get CT chest. CT shows Extensive bilateral pulmonary arterial pulmonary emboli with involvement of all lobes as detailed above. Evidence of right heart strain with RV/LV ratio of 2.0. Cardiology consulted for new on set PE.    Allergies    No Known Allergies    Intolerances    	    MEDICATIONS    heparin   Injectable 5500 Unit(s) IV Push every 6 hours PRN  heparin   Injectable 2500 Unit(s) IV Push every 6 hours PRN  heparin  Infusion.  Unit(s)/Hr IV Continuous <Continuous>  ciprofloxacin     Tablet 250 milliGRAM(s) Oral two times a day  acetaminophen     Tablet .. 650 milliGRAM(s) Oral every 6 hours PRN  melatonin 3 milliGRAM(s) Oral at bedtime PRN  ondansetron Injectable 4 milliGRAM(s) IV Push every 8 hours PRN  aluminum hydroxide/magnesium hydroxide/simethicone Suspension 30 milliLiter(s) Oral every 4 hours PRN  atorvastatin 40 milliGRAM(s) Oral at bedtime  finasteride 5 milliGRAM(s) Oral daily  sodium chloride 0.9%. 1000 milliLiter(s) IV Continuous <Continuous>  tamsulosin 0.4 milliGRAM(s) Oral at bedtime            PAST MEDICAL & SURGICAL HISTORY:  Adenocarcinoma of lung      Lung cancer metastatic to brain      Testicular cancer      Benign prostate hyperplasia      HLD (hyperlipidemia)      S/P orchiectomy          FAMILY HISTORY:  CA, PE    SOCIAL HISTORY    Marital Status:   Occupation:   Lives with: wife    SUBSTANCE USE  Tobacco Usage:  ( ) None ( ) never smoked   ( ) former smoker  ( ) current smoker; Packs per day:   Alcohol Usage: ( ) none  ( ) occasional ( ) 2-3 times a week ( ) daily; Last drink:   Recreational drugs ( ) None    CONSTITUTIONAL: No fevers, No chills, No fatigue, No weight gain  EYES: No vision changes   ENT: No congestion, No ear pain, No sore throat.  NECK: No pain, No stiffness  RESPIRATORY: + shortness of breath, No cough, No wheezing, No hemoptysis  CARDIOVASCULAR: No chest pain. No palpitations, No ZEE, No orthopnea, No paroxysmal nocturnal dyspnea, No pleuritic pain  GASTROINTESTINAL: No abdominal pain, No nausea, No vomiting, No hematemesis, No diarrhea No constipation. No melena  GENITOURINARY: No dysuria, No frequency, No incontinence, No hematuria  NEUROLOGICAL: No dizziness, No lightheadedness, No syncope, No LOC, No headache, No numbness or weakness  MUSCULOSKELETAL: No Edema, No joint pain, No joint swelling.  PSYCHIATRIC: No anxiety, No depression  DERMATOLOGY: No diaphoresis. No itching, No rashes, No pressure ulcers  HEME/LYMPH: No easy bruising, or bleeding gums    All other review of systems is negative unless indicated above.    VITAL SIGNS  T(C): 37 (11-12-23 @ 08:30), Max: 37.1 (11-11-23 @ 21:07)  HR: 128 (11-12-23 @ 08:30) (121 - 128)  BP: 116/73 (11-12-23 @ 08:30) (116/73 - 125/81)  RR: 18 (11-12-23 @ 08:30) (17 - 18)  SpO2: 94% (11-12-23 @ 08:30) (80% - 96%)  Wt(kg): --    Appearance: NAD, no distress  HEENT: Moist Mucous Membranes, Anicteric.  Cardiovascular: Regular rate and rhythm, Normal S1 S2, No JVD, No murmurs  Respiratory: Lungs clear to auscultation. diminished on right LL, No rales, No rhonchi, No wheezing. No tenderness to palpation  Gastrointestinal:  Soft, Non-tender, + BS  Neurologic: Non-focal, A&Ox3  Skin: Warm and dry, No rashes, No ecchymosis, No cyanosis  Musculoskeletal: No clubbing, No cyanosis, No edema  Vascular: Peripheral pulses palpable 2+ bilaterally  Psychiatry: Mood & affect appropriate      	    		        I&O's Summary    11 Nov 2023 07:01  -  12 Nov 2023 07:00  --------------------------------------------------------  IN: 550 mL / OUT: 2000 mL / NET: -1450 mL        LABORATORY VALUES	 	                          14.3   12.61 )-----------( 165      ( 12 Nov 2023 05:16 )             45.1       11-12    140  |  99  |  21  ----------------------------<  88  4.1   |  27  |  1.58<H>  11-11    134<L>  |  100  |  26<H>  ----------------------------<  110<H>  4.5   |  23  |  1.74<H>    Ca    9.8      12 Nov 2023 05:16  Ca    9.1      11 Nov 2023 05:28  Phos  3.1     11-12  Mg     2.10     11-12        Prothrombin Time, Plasma: 12.6 sec (11-12 @ 09:44)      CARDIAC MARKERS:  Creatine Kinase, Serum: 59 U/L (11-12 @ 05:16)    troponin 40          Blood Gas Venous - Lactate: 1.0 mmol/L (11-10 @ 02:00)      Urinalysis Basic - ( 12 Nov 2023 05:16 )    Color: x / Appearance: x / SG: x / pH: x  Gluc: 88 mg/dL / Ketone: x  / Bili: x / Urobili: x   Blood: x / Protein: x / Nitrite: x   Leuk Esterase: x / RBC: x / WBC x   Sq Epi: x / Non Sq Epi: x / Bacteria: x      CAPILLARY BLOOD GLUCOSE          11-10 @ 02:34  229E Corona Virus --  Adenovirus NotDetec  Bordetella pertussis NotDetec  Chlamydia pneumoniae NotDetec  Entero/Rhino Virus NotDetec  HKU1 Coronavirus --  hMPV NotDetec  Influenza A NotDetec  Influenza AH1 --  Influenza AH1 2009 --  Influenza AH3 --  Influenza B NotDetec  Mycoplasma pneumoniae NotDetec  NL63 Coronavirus --  OC43 Corornavirus --  Parainfluenza 1 NotDetec  Parainfluenza 2 NotDetec        	    ECG:  ST , TWI in III, aVF    RADIOLOGY:  < from: CT Angio Chest PE Protocol w/ IV Cont (11.12.23 @ 12:02) >  CT Angiography of the Chest.  Sagittal and coronal reformats were performed as well as 3D (MIP)   reconstructions.    FINDINGS:    LUNGS AND AIRWAYS: Patent central airways.  Status post right lower   lobectomy. Stable scarring within the right apex. Stable 4 mm right   apical nodule.  PLEURA: No pleural effusion.  MEDIASTINUM AND MAYRA: No lymphadenopathy.  VESSELS: Pulmonary arterial emboli within the distal aspects of the   bilateral central pulmonary arteries extending into the bilateral upper   lobe and the left lower lobe lobar, segmental and subsegmental airways   with involvement of the interlobar pulmonary arteries. Pulmonary embolus   extends into the right middle lobar pulmonary artery. Evidence of right   heart strain with RV/LV ratio of 2.0 with associated flattening of the   interventricular septum.  HEART: Small pericardial effusion.  CHEST WALL AND LOWER NECK: Bilateral gynecomastia.  VISUALIZED UPPER ABDOMEN: Hepatic cyst and subcentimeter hypodensities   which are too small to characterize elevation of the right hemidiaphragm   as on the prior study. Cholelithiasis.  BONES: Degenerative changes. Chronic right rib deformities as on the   prior study.    IMPRESSION:  Extensive bilateral pulmonary arterial pulmonary emboli with involvement   of all lobes as detailed above. Evidence of right heart strain with RV/LV   ratio of 2.0.    CARMEN Restrepo was informed of these results by Fletcher Mohan MD with   read back at about 12:40 PM on 11/12/2023    --- End of Report ---          FLETCHER MOHAN MD; Resident Radiologist  This document has been electronically signed.  KYRIE MARIE MD; Attending Radiologist    < end of copied text >  	    	 CC:  Patient is a 63y old  Male who presents with a chief complaint of KAMI (12 Nov 2023 14:46)    HPI:  64 y.o male with hx of testicular cancer s/p orchectomy, lung adenocarcinoma with brain mets on lorlatinib, BPH, HLD, who presents for abnormal labs and abdominal distention. Patient report for the last 2 days he has noticed suprapubic distention, and felt like he couldn't breath. State as baseline he exercises and walks the whole [place] without issues, without shortness of breath or chest pain. Since he has had the abdominal distention he had associated shortness of breath. Denies any chest pain, dysuria, changes in urine color or smell, fevers or chills. He states otherwise has been well, taking his medications regularly.   In the ED patient noted with creat 2.84, CT abdomen showed moderate bilateral hydroureteronephrosis, distended urinary bladder and enlarged prostate gland in the setting of obstructive uropathy. Christopher was placed by Urology. Patient report significant improvement after Christopher placement.  (10 Nov 2023 13:38)  This morning, pt reports he was straining self to have a bowel movement and subsequently had SOB and lightheadedness. Pt found to have hypoxia in 80's and supplemental oxygen applied, and went to get CT chest. CT shows Extensive bilateral pulmonary arterial pulmonary emboli with involvement of all lobes as detailed above. Evidence of right heart strain with RV/LV ratio of 2.0. Cardiology consulted for new on set PE.    Allergies    No Known Allergies    Intolerances    	    MEDICATIONS    heparin   Injectable 5500 Unit(s) IV Push every 6 hours PRN  heparin   Injectable 2500 Unit(s) IV Push every 6 hours PRN  heparin  Infusion.  Unit(s)/Hr IV Continuous <Continuous>  ciprofloxacin     Tablet 250 milliGRAM(s) Oral two times a day  acetaminophen     Tablet .. 650 milliGRAM(s) Oral every 6 hours PRN  melatonin 3 milliGRAM(s) Oral at bedtime PRN  ondansetron Injectable 4 milliGRAM(s) IV Push every 8 hours PRN  aluminum hydroxide/magnesium hydroxide/simethicone Suspension 30 milliLiter(s) Oral every 4 hours PRN  atorvastatin 40 milliGRAM(s) Oral at bedtime  finasteride 5 milliGRAM(s) Oral daily  sodium chloride 0.9%. 1000 milliLiter(s) IV Continuous <Continuous>  tamsulosin 0.4 milliGRAM(s) Oral at bedtime            PAST MEDICAL & SURGICAL HISTORY:  Adenocarcinoma of lung      Lung cancer metastatic to brain      Testicular cancer      Benign prostate hyperplasia      HLD (hyperlipidemia)      S/P orchiectomy          FAMILY HISTORY:  CA, PE    SOCIAL HISTORY    Marital Status:   Occupation:   Lives with: wife    SUBSTANCE USE  Tobacco Usage:  ( ) None ( ) never smoked   ( ) former smoker  ( ) current smoker; Packs per day:   Alcohol Usage: ( ) none  ( ) occasional ( ) 2-3 times a week ( ) daily; Last drink:   Recreational drugs ( ) None    CONSTITUTIONAL: No fevers, No chills, No fatigue, No weight gain  EYES: No vision changes   ENT: No congestion, No ear pain, No sore throat.  NECK: No pain, No stiffness  RESPIRATORY: + shortness of breath, No cough, No wheezing, No hemoptysis  CARDIOVASCULAR: No chest pain. No palpitations, No ZEE, No orthopnea, No paroxysmal nocturnal dyspnea, No pleuritic pain  GASTROINTESTINAL: No abdominal pain, No nausea, No vomiting, No hematemesis, No diarrhea No constipation. No melena  GENITOURINARY: No dysuria, No frequency, No incontinence, No hematuria  NEUROLOGICAL: No dizziness, No lightheadedness, No syncope, No LOC, No headache, No numbness or weakness  MUSCULOSKELETAL: No Edema, No joint pain, No joint swelling.  PSYCHIATRIC: No anxiety, No depression  DERMATOLOGY: No diaphoresis. No itching, No rashes, No pressure ulcers  HEME/LYMPH: No easy bruising, or bleeding gums    All other review of systems is negative unless indicated above.    VITAL SIGNS  T(C): 37 (11-12-23 @ 08:30), Max: 37.1 (11-11-23 @ 21:07)  HR: 128 (11-12-23 @ 08:30) (121 - 128)  BP: 116/73 (11-12-23 @ 08:30) (116/73 - 125/81)  RR: 18 (11-12-23 @ 08:30) (17 - 18)  SpO2: 94% (11-12-23 @ 08:30) (80% - 96%)  Wt(kg): --    Appearance: NAD, no distress  HEENT: Moist Mucous Membranes, Anicteric.  Cardiovascular: Regular rate and rhythm, Normal S1 S2, No JVD, No murmurs  Respiratory: Lungs clear to auscultation. diminished on right LL, No rales, No rhonchi, No wheezing. No tenderness to palpation  Gastrointestinal:  Soft, Non-tender, + BS  Neurologic: Non-focal, A&Ox3  Skin: Warm and dry, No rashes, No ecchymosis, No cyanosis  Musculoskeletal: No clubbing, No cyanosis, No edema  Vascular: Peripheral pulses palpable 2+ bilaterally  Psychiatry: Mood & affect appropriate      	    		        I&O's Summary    11 Nov 2023 07:01  -  12 Nov 2023 07:00  --------------------------------------------------------  IN: 550 mL / OUT: 2000 mL / NET: -1450 mL        LABORATORY VALUES	 	                          14.3   12.61 )-----------( 165      ( 12 Nov 2023 05:16 )             45.1       11-12    140  |  99  |  21  ----------------------------<  88  4.1   |  27  |  1.58<H>  11-11    134<L>  |  100  |  26<H>  ----------------------------<  110<H>  4.5   |  23  |  1.74<H>    Ca    9.8      12 Nov 2023 05:16  Ca    9.1      11 Nov 2023 05:28  Phos  3.1     11-12  Mg     2.10     11-12        Prothrombin Time, Plasma: 12.6 sec (11-12 @ 09:44)      CARDIAC MARKERS:  Creatine Kinase, Serum: 59 U/L (11-12 @ 05:16)    troponin 40          Blood Gas Venous - Lactate: 1.0 mmol/L (11-10 @ 02:00)      Urinalysis Basic - ( 12 Nov 2023 05:16 )    Color: x / Appearance: x / SG: x / pH: x  Gluc: 88 mg/dL / Ketone: x  / Bili: x / Urobili: x   Blood: x / Protein: x / Nitrite: x   Leuk Esterase: x / RBC: x / WBC x   Sq Epi: x / Non Sq Epi: x / Bacteria: x      CAPILLARY BLOOD GLUCOSE          11-10 @ 02:34  229E Corona Virus --  Adenovirus NotDetec  Bordetella pertussis NotDetec  Chlamydia pneumoniae NotDetec  Entero/Rhino Virus NotDetec  HKU1 Coronavirus --  hMPV NotDetec  Influenza A NotDetec  Influenza AH1 --  Influenza AH1 2009 --  Influenza AH3 --  Influenza B NotDetec  Mycoplasma pneumoniae NotDetec  NL63 Coronavirus --  OC43 Corornavirus --  Parainfluenza 1 NotDetec  Parainfluenza 2 NotDetec        	    ECG:  ST , TWI in III, aVF    RADIOLOGY:  < from: CT Angio Chest PE Protocol w/ IV Cont (11.12.23 @ 12:02) >  CT Angiography of the Chest.  Sagittal and coronal reformats were performed as well as 3D (MIP)   reconstructions.    FINDINGS:    LUNGS AND AIRWAYS: Patent central airways.  Status post right lower   lobectomy. Stable scarring within the right apex. Stable 4 mm right   apical nodule.  PLEURA: No pleural effusion.  MEDIASTINUM AND MAYRA: No lymphadenopathy.  VESSELS: Pulmonary arterial emboli within the distal aspects of the   bilateral central pulmonary arteries extending into the bilateral upper   lobe and the left lower lobe lobar, segmental and subsegmental airways   with involvement of the interlobar pulmonary arteries. Pulmonary embolus   extends into the right middle lobar pulmonary artery. Evidence of right   heart strain with RV/LV ratio of 2.0 with associated flattening of the   interventricular septum.  HEART: Small pericardial effusion.  CHEST WALL AND LOWER NECK: Bilateral gynecomastia.  VISUALIZED UPPER ABDOMEN: Hepatic cyst and subcentimeter hypodensities   which are too small to characterize elevation of the right hemidiaphragm   as on the prior study. Cholelithiasis.  BONES: Degenerative changes. Chronic right rib deformities as on the   prior study.    IMPRESSION:  Extensive bilateral pulmonary arterial pulmonary emboli with involvement   of all lobes as detailed above. Evidence of right heart strain with RV/LV   ratio of 2.0.    CARMEN Restrepo was informed of these results by Fletcher Mohan MD with   read back at about 12:40 PM on 11/12/2023    --- End of Report ---          FLETCHER MOHAN MD; Resident Radiologist  This document has been electronically signed.  KYRIE MARIE MD; Attending Radiologist    < end of copied text >

## 2023-11-12 NOTE — CONSULT NOTE ADULT - ASSESSMENT
64 y.o male with hx of testicular cancer s/p orchectomy, lung adenocarcinoma with brain mets on lorlatinib, BPH, HLD, who presents for abnormal labs and abdominal distention.    ALK+ metastatic NSCLC  - Follows with Dr. Nissa Luna at [facility].   - Clinically stable from oncologic perspective. No evidence of progressive/metastatic disease  - Completed SRS on 12/21/2022  - On treatment with lorlatinib, hold for now given KAMI. May resume upon d/c, d/w patient.    H/o testicular cancer  - s/p right orchiectomy in 2009    KAMI, hydroureteronephrosis  - CTAP w/ redemonstration of moderate bilateral hydroureteronephrosis, distended urinary bladder and enlarged prostate gland in the setting of obstructive uropathy.  Consider Christopher catheterization. Slight interval decrease in size of bilateral external iliac lymph nodes.  - s/p Christopher catheter placement by Urology  - Urology follow up   - 11/10 blood and urine cultures negative     Parrish Mace PA-C  Hematology/Oncology  [facility]  [phone] (office)
    ASSESSMENT AND PLAN  64 y.o male with hx of testicular cancer s/p orchectomy, lung adenocarcinoma with brain mets on lorlatinib, BPH, HLD, who presents for abnormal labs and abdominal distention. Found to have KAMI and obstructive uropathy, had indwelling catheter with resolution of SOB. Today, pt had an episode of SOB and hypoxia, found to have elevated d-dimer in 5000's & CT chest performed. CT findings showed extensive bilateral pulm art emboli with right heart strain RV/LV ratio 2.0. Cardiology consult called for new onset PE and possible PERT intervention.        PLAN    #PE  -unclear etiology; possibly r/t CA, vs decreased activities i/s/o SOB  -Case discussed with the fellow and he discussed with Dr. Muñoz, [facility] Interventionalist, given patient is currently clinically stable, will not need to activate PERT   -continue tele monitoring     -ECG:  ST , TWI in III, aVF  -CT with extensive bilateral pulmonary arterial pulmonary emboli with involvement of all lobes as detailed above. Evidence of right heart strain with RV/LV ratio of 2.0.   -troponin 40 <---71<---74  -proBNP pending result  -currrently hemodynamically stable (/73, , RR20, O2sat 95% on 3 liters n/c)  -c/w heparin drip  -consider b/l LE doppler  -echo in am  -house cardiology will follow, call # [phone] for questions.    Case discussed with Dr. Austin Elizondo. cardiology fellow  Attending attestation to follow.

## 2023-11-12 NOTE — PROGRESS NOTE ADULT - TIME BILLING
Time-based billing (NON-critical care).     51 minutes spent on total encounter; more than 50% of the visit was spent counseling and / or coordinating care by the attending physician.  The necessity of the time spent during the encounter on this date of service was due to:     review of laboratory data, radiology results, consultants' recommendations, documentation in [place], discussion with patient/consultants and interdisciplinary staff (such as , social workers, etc). Interventions were performed as documented above.

## 2023-11-12 NOTE — PROGRESS NOTE ADULT - PROBLEM SELECTOR PLAN 1
Patient acutely hypoxic to 70s/80s overnight and worsening tachycardia to 130s  - D-dimer > 5K   - CTA showing "Extensive bilateral pulmonary arterial pulmonary emboli with involvement of all lobes... Evidence of right heart strain with RV/LV"  - Started on heparin gtt  - PERT team evaluation called, will follow-up   - Trops checked, downtrending from admission  - TTE pending for R heart strain   - Monitor on tele/, q4h vital signs   - O2 as needed, currently on 2-3L NC   - Check dopplers Patient acutely hypoxic to 70s/80s overnight and worsening tachycardia to 130s  - D-dimer > 5K   - Urgent CTA chest showing "Extensive bilateral pulmonary arterial pulmonary emboli with involvement of all lobes... Evidence of right heart strain with RV/LV"  - Started on heparin gtt  - PERT team evaluation called, will follow-up   - Trops checked, downtrending from admission  - TTE pending to evaluate for R heart strain   - Monitor on tele/, q4h vital signs   - O2 as needed, currently on 2-3L NC   - Check dopplers

## 2023-11-13 LAB
ANION GAP SERPL CALC-SCNC: 12 MMOL/L — SIGNIFICANT CHANGE UP (ref 7–14)
ANION GAP SERPL CALC-SCNC: 12 MMOL/L — SIGNIFICANT CHANGE UP (ref 7–14)
APTT BLD: 51.2 SEC — HIGH (ref 24.5–35.6)
APTT BLD: 51.2 SEC — HIGH (ref 24.5–35.6)
APTT BLD: 56.6 SEC — HIGH (ref 24.5–35.6)
APTT BLD: 56.6 SEC — HIGH (ref 24.5–35.6)
APTT BLD: 83 SEC — HIGH (ref 24.5–35.6)
APTT BLD: 83 SEC — HIGH (ref 24.5–35.6)
BASOPHILS # BLD AUTO: 0.05 K/UL — SIGNIFICANT CHANGE UP (ref 0–0.2)
BASOPHILS # BLD AUTO: 0.05 K/UL — SIGNIFICANT CHANGE UP (ref 0–0.2)
BASOPHILS NFR BLD AUTO: 0.6 % — SIGNIFICANT CHANGE UP (ref 0–2)
BASOPHILS NFR BLD AUTO: 0.6 % — SIGNIFICANT CHANGE UP (ref 0–2)
BUN SERPL-MCNC: 23 MG/DL — SIGNIFICANT CHANGE UP (ref 7–23)
BUN SERPL-MCNC: 23 MG/DL — SIGNIFICANT CHANGE UP (ref 7–23)
CALCIUM SERPL-MCNC: 8.9 MG/DL — SIGNIFICANT CHANGE UP (ref 8.4–10.5)
CALCIUM SERPL-MCNC: 8.9 MG/DL — SIGNIFICANT CHANGE UP (ref 8.4–10.5)
CHLORIDE SERPL-SCNC: 100 MMOL/L — SIGNIFICANT CHANGE UP (ref 98–107)
CHLORIDE SERPL-SCNC: 100 MMOL/L — SIGNIFICANT CHANGE UP (ref 98–107)
CO2 SERPL-SCNC: 27 MMOL/L — SIGNIFICANT CHANGE UP (ref 22–31)
CO2 SERPL-SCNC: 27 MMOL/L — SIGNIFICANT CHANGE UP (ref 22–31)
CREAT SERPL-MCNC: 1.92 MG/DL — HIGH (ref 0.5–1.3)
CREAT SERPL-MCNC: 1.92 MG/DL — HIGH (ref 0.5–1.3)
EGFR: 38 ML/MIN/1.73M2 — LOW
EGFR: 38 ML/MIN/1.73M2 — LOW
EOSINOPHIL # BLD AUTO: 0.44 K/UL — SIGNIFICANT CHANGE UP (ref 0–0.5)
EOSINOPHIL # BLD AUTO: 0.44 K/UL — SIGNIFICANT CHANGE UP (ref 0–0.5)
EOSINOPHIL NFR BLD AUTO: 5 % — SIGNIFICANT CHANGE UP (ref 0–6)
EOSINOPHIL NFR BLD AUTO: 5 % — SIGNIFICANT CHANGE UP (ref 0–6)
GLUCOSE SERPL-MCNC: 109 MG/DL — HIGH (ref 70–99)
GLUCOSE SERPL-MCNC: 109 MG/DL — HIGH (ref 70–99)
HCT VFR BLD CALC: 37.7 % — LOW (ref 39–50)
HCT VFR BLD CALC: 37.7 % — LOW (ref 39–50)
HGB BLD-MCNC: 12.2 G/DL — LOW (ref 13–17)
HGB BLD-MCNC: 12.2 G/DL — LOW (ref 13–17)
IANC: 6.11 K/UL — SIGNIFICANT CHANGE UP (ref 1.8–7.4)
IANC: 6.11 K/UL — SIGNIFICANT CHANGE UP (ref 1.8–7.4)
IMM GRANULOCYTES NFR BLD AUTO: 0.7 % — SIGNIFICANT CHANGE UP (ref 0–0.9)
IMM GRANULOCYTES NFR BLD AUTO: 0.7 % — SIGNIFICANT CHANGE UP (ref 0–0.9)
LYMPHOCYTES # BLD AUTO: 1.44 K/UL — SIGNIFICANT CHANGE UP (ref 1–3.3)
LYMPHOCYTES # BLD AUTO: 1.44 K/UL — SIGNIFICANT CHANGE UP (ref 1–3.3)
LYMPHOCYTES # BLD AUTO: 16.3 % — SIGNIFICANT CHANGE UP (ref 13–44)
LYMPHOCYTES # BLD AUTO: 16.3 % — SIGNIFICANT CHANGE UP (ref 13–44)
MAGNESIUM SERPL-MCNC: 2 MG/DL — SIGNIFICANT CHANGE UP (ref 1.6–2.6)
MAGNESIUM SERPL-MCNC: 2 MG/DL — SIGNIFICANT CHANGE UP (ref 1.6–2.6)
MCHC RBC-ENTMCNC: 28.6 PG — SIGNIFICANT CHANGE UP (ref 27–34)
MCHC RBC-ENTMCNC: 28.6 PG — SIGNIFICANT CHANGE UP (ref 27–34)
MCHC RBC-ENTMCNC: 32.4 GM/DL — SIGNIFICANT CHANGE UP (ref 32–36)
MCHC RBC-ENTMCNC: 32.4 GM/DL — SIGNIFICANT CHANGE UP (ref 32–36)
MCV RBC AUTO: 88.3 FL — SIGNIFICANT CHANGE UP (ref 80–100)
MCV RBC AUTO: 88.3 FL — SIGNIFICANT CHANGE UP (ref 80–100)
MONOCYTES # BLD AUTO: 0.74 K/UL — SIGNIFICANT CHANGE UP (ref 0–0.9)
MONOCYTES # BLD AUTO: 0.74 K/UL — SIGNIFICANT CHANGE UP (ref 0–0.9)
MONOCYTES NFR BLD AUTO: 8.4 % — SIGNIFICANT CHANGE UP (ref 2–14)
MONOCYTES NFR BLD AUTO: 8.4 % — SIGNIFICANT CHANGE UP (ref 2–14)
NEUTROPHILS # BLD AUTO: 6.11 K/UL — SIGNIFICANT CHANGE UP (ref 1.8–7.4)
NEUTROPHILS # BLD AUTO: 6.11 K/UL — SIGNIFICANT CHANGE UP (ref 1.8–7.4)
NEUTROPHILS NFR BLD AUTO: 69 % — SIGNIFICANT CHANGE UP (ref 43–77)
NEUTROPHILS NFR BLD AUTO: 69 % — SIGNIFICANT CHANGE UP (ref 43–77)
NRBC # BLD: 0 /100 WBCS — SIGNIFICANT CHANGE UP (ref 0–0)
NRBC # BLD: 0 /100 WBCS — SIGNIFICANT CHANGE UP (ref 0–0)
NRBC # FLD: 0 K/UL — SIGNIFICANT CHANGE UP (ref 0–0)
NRBC # FLD: 0 K/UL — SIGNIFICANT CHANGE UP (ref 0–0)
PHOSPHATE SERPL-MCNC: 3.2 MG/DL — SIGNIFICANT CHANGE UP (ref 2.5–4.5)
PHOSPHATE SERPL-MCNC: 3.2 MG/DL — SIGNIFICANT CHANGE UP (ref 2.5–4.5)
PLATELET # BLD AUTO: 112 K/UL — LOW (ref 150–400)
PLATELET # BLD AUTO: 112 K/UL — LOW (ref 150–400)
POTASSIUM SERPL-MCNC: 3.9 MMOL/L — SIGNIFICANT CHANGE UP (ref 3.5–5.3)
POTASSIUM SERPL-MCNC: 3.9 MMOL/L — SIGNIFICANT CHANGE UP (ref 3.5–5.3)
POTASSIUM SERPL-SCNC: 3.9 MMOL/L — SIGNIFICANT CHANGE UP (ref 3.5–5.3)
POTASSIUM SERPL-SCNC: 3.9 MMOL/L — SIGNIFICANT CHANGE UP (ref 3.5–5.3)
RBC # BLD: 4.27 M/UL — SIGNIFICANT CHANGE UP (ref 4.2–5.8)
RBC # BLD: 4.27 M/UL — SIGNIFICANT CHANGE UP (ref 4.2–5.8)
RBC # FLD: 13.6 % — SIGNIFICANT CHANGE UP (ref 10.3–14.5)
RBC # FLD: 13.6 % — SIGNIFICANT CHANGE UP (ref 10.3–14.5)
SODIUM SERPL-SCNC: 139 MMOL/L — SIGNIFICANT CHANGE UP (ref 135–145)
SODIUM SERPL-SCNC: 139 MMOL/L — SIGNIFICANT CHANGE UP (ref 135–145)
WBC # BLD: 8.84 K/UL — SIGNIFICANT CHANGE UP (ref 3.8–10.5)
WBC # BLD: 8.84 K/UL — SIGNIFICANT CHANGE UP (ref 3.8–10.5)
WBC # FLD AUTO: 8.84 K/UL — SIGNIFICANT CHANGE UP (ref 3.8–10.5)
WBC # FLD AUTO: 8.84 K/UL — SIGNIFICANT CHANGE UP (ref 3.8–10.5)

## 2023-11-13 PROCEDURE — 93970 EXTREMITY STUDY: CPT | Mod: 26

## 2023-11-13 PROCEDURE — 99233 SBSQ HOSP IP/OBS HIGH 50: CPT

## 2023-11-13 PROCEDURE — 99223 1ST HOSP IP/OBS HIGH 75: CPT

## 2023-11-13 PROCEDURE — 93306 TTE W/DOPPLER COMPLETE: CPT | Mod: 26

## 2023-11-13 RX ORDER — SENNA PLUS 8.6 MG/1
2 TABLET ORAL AT BEDTIME
Refills: 0 | Status: DISCONTINUED | OUTPATIENT
Start: 2023-11-13 | End: 2023-11-16

## 2023-11-13 RX ORDER — POLYETHYLENE GLYCOL 3350 17 G/17G
17 POWDER, FOR SOLUTION ORAL DAILY
Refills: 0 | Status: DISCONTINUED | OUTPATIENT
Start: 2023-11-13 | End: 2023-11-16

## 2023-11-13 RX ADMIN — HEPARIN SODIUM 2500 UNIT(S): 5000 INJECTION INTRAVENOUS; SUBCUTANEOUS at 18:22

## 2023-11-13 RX ADMIN — Medication 250 MILLIGRAM(S): at 06:20

## 2023-11-13 RX ADMIN — HEPARIN SODIUM 1700 UNIT(S)/HR: 5000 INJECTION INTRAVENOUS; SUBCUTANEOUS at 22:12

## 2023-11-13 RX ADMIN — HEPARIN SODIUM 1500 UNIT(S)/HR: 5000 INJECTION INTRAVENOUS; SUBCUTANEOUS at 07:14

## 2023-11-13 RX ADMIN — HEPARIN SODIUM 1500 UNIT(S)/HR: 5000 INJECTION INTRAVENOUS; SUBCUTANEOUS at 04:02

## 2023-11-13 RX ADMIN — HEPARIN SODIUM 2500 UNIT(S): 5000 INJECTION INTRAVENOUS; SUBCUTANEOUS at 11:52

## 2023-11-13 RX ADMIN — ATORVASTATIN CALCIUM 40 MILLIGRAM(S): 80 TABLET, FILM COATED ORAL at 22:14

## 2023-11-13 RX ADMIN — TAMSULOSIN HYDROCHLORIDE 0.4 MILLIGRAM(S): 0.4 CAPSULE ORAL at 22:14

## 2023-11-13 RX ADMIN — HEPARIN SODIUM 1700 UNIT(S)/HR: 5000 INJECTION INTRAVENOUS; SUBCUTANEOUS at 19:06

## 2023-11-13 RX ADMIN — SODIUM CHLORIDE 75 MILLILITER(S): 9 INJECTION INTRAMUSCULAR; INTRAVENOUS; SUBCUTANEOUS at 19:08

## 2023-11-13 RX ADMIN — SENNA PLUS 2 TABLET(S): 8.6 TABLET ORAL at 22:14

## 2023-11-13 RX ADMIN — HEPARIN SODIUM 1600 UNIT(S)/HR: 5000 INJECTION INTRAVENOUS; SUBCUTANEOUS at 11:47

## 2023-11-13 RX ADMIN — SODIUM CHLORIDE 75 MILLILITER(S): 9 INJECTION INTRAMUSCULAR; INTRAVENOUS; SUBCUTANEOUS at 07:14

## 2023-11-13 RX ADMIN — HEPARIN SODIUM 1500 UNIT(S)/HR: 5000 INJECTION INTRAVENOUS; SUBCUTANEOUS at 06:31

## 2023-11-13 RX ADMIN — FINASTERIDE 5 MILLIGRAM(S): 5 TABLET, FILM COATED ORAL at 18:00

## 2023-11-13 RX ADMIN — Medication 250 MILLIGRAM(S): at 18:00

## 2023-11-13 RX ADMIN — HEPARIN SODIUM 1700 UNIT(S)/HR: 5000 INJECTION INTRAVENOUS; SUBCUTANEOUS at 18:21

## 2023-11-13 NOTE — PROGRESS NOTE ADULT - PROBLEM SELECTOR PLAN 1
Patient acutely hypoxic to 70s/80s overnight and worsening tachycardia to 130s  - D-dimer > 5K   - Urgent CTA chest showing "Extensive bilateral pulmonary arterial pulmonary emboli with involvement of all lobes... Evidence of right heart strain with RV/LV"  - Cont heparin gtt  - PERT team evaluation done no inervention  - Trops checked, downtrending from admission  - TTE showed  Left ventricular systolic function is mildly decreased with an ejection fraction. Global left ventricular hypokinesis. Severely enlarged right ventricular cavity size and severely reduced systolic function. Moderate pulmonary hypertension.  Moderate to large pericardial effusion. No echocardiographic evidence of cardiac tamponade.  - Monitor on tele/, q4h vital signs   - O2 as needed, currently on 2-3L NC   - b/l LE dopplers showed No evidence of deep venous thrombosis in either lower extremity.  -f/u cards recs

## 2023-11-14 ENCOUNTER — TRANSCRIPTION ENCOUNTER (OUTPATIENT)
Age: 63
End: 2023-11-14

## 2023-11-14 LAB
ANION GAP SERPL CALC-SCNC: 10 MMOL/L — SIGNIFICANT CHANGE UP (ref 7–14)
ANION GAP SERPL CALC-SCNC: 10 MMOL/L — SIGNIFICANT CHANGE UP (ref 7–14)
APTT BLD: 66.7 SEC — HIGH (ref 24.5–35.6)
APTT BLD: 66.7 SEC — HIGH (ref 24.5–35.6)
APTT BLD: 69.5 SEC — HIGH (ref 24.5–35.6)
APTT BLD: 69.5 SEC — HIGH (ref 24.5–35.6)
BUN SERPL-MCNC: 20 MG/DL — SIGNIFICANT CHANGE UP (ref 7–23)
BUN SERPL-MCNC: 20 MG/DL — SIGNIFICANT CHANGE UP (ref 7–23)
CALCIUM SERPL-MCNC: 8.9 MG/DL — SIGNIFICANT CHANGE UP (ref 8.4–10.5)
CALCIUM SERPL-MCNC: 8.9 MG/DL — SIGNIFICANT CHANGE UP (ref 8.4–10.5)
CHLORIDE SERPL-SCNC: 106 MMOL/L — SIGNIFICANT CHANGE UP (ref 98–107)
CHLORIDE SERPL-SCNC: 106 MMOL/L — SIGNIFICANT CHANGE UP (ref 98–107)
CO2 SERPL-SCNC: 24 MMOL/L — SIGNIFICANT CHANGE UP (ref 22–31)
CO2 SERPL-SCNC: 24 MMOL/L — SIGNIFICANT CHANGE UP (ref 22–31)
CREAT SERPL-MCNC: 1.48 MG/DL — HIGH (ref 0.5–1.3)
CREAT SERPL-MCNC: 1.48 MG/DL — HIGH (ref 0.5–1.3)
EGFR: 53 ML/MIN/1.73M2 — LOW
EGFR: 53 ML/MIN/1.73M2 — LOW
GLUCOSE SERPL-MCNC: 96 MG/DL — SIGNIFICANT CHANGE UP (ref 70–99)
GLUCOSE SERPL-MCNC: 96 MG/DL — SIGNIFICANT CHANGE UP (ref 70–99)
HCT VFR BLD CALC: 35.3 % — LOW (ref 39–50)
HCT VFR BLD CALC: 35.3 % — LOW (ref 39–50)
HGB BLD-MCNC: 11.1 G/DL — LOW (ref 13–17)
HGB BLD-MCNC: 11.1 G/DL — LOW (ref 13–17)
MAGNESIUM SERPL-MCNC: 1.8 MG/DL — SIGNIFICANT CHANGE UP (ref 1.6–2.6)
MAGNESIUM SERPL-MCNC: 1.8 MG/DL — SIGNIFICANT CHANGE UP (ref 1.6–2.6)
MCHC RBC-ENTMCNC: 27.8 PG — SIGNIFICANT CHANGE UP (ref 27–34)
MCHC RBC-ENTMCNC: 27.8 PG — SIGNIFICANT CHANGE UP (ref 27–34)
MCHC RBC-ENTMCNC: 31.4 GM/DL — LOW (ref 32–36)
MCHC RBC-ENTMCNC: 31.4 GM/DL — LOW (ref 32–36)
MCV RBC AUTO: 88.3 FL — SIGNIFICANT CHANGE UP (ref 80–100)
MCV RBC AUTO: 88.3 FL — SIGNIFICANT CHANGE UP (ref 80–100)
NRBC # BLD: 0 /100 WBCS — SIGNIFICANT CHANGE UP (ref 0–0)
NRBC # BLD: 0 /100 WBCS — SIGNIFICANT CHANGE UP (ref 0–0)
NRBC # FLD: 0 K/UL — SIGNIFICANT CHANGE UP (ref 0–0)
NRBC # FLD: 0 K/UL — SIGNIFICANT CHANGE UP (ref 0–0)
PHOSPHATE SERPL-MCNC: 2.8 MG/DL — SIGNIFICANT CHANGE UP (ref 2.5–4.5)
PHOSPHATE SERPL-MCNC: 2.8 MG/DL — SIGNIFICANT CHANGE UP (ref 2.5–4.5)
PLATELET # BLD AUTO: 112 K/UL — LOW (ref 150–400)
PLATELET # BLD AUTO: 112 K/UL — LOW (ref 150–400)
POTASSIUM SERPL-MCNC: 3.9 MMOL/L — SIGNIFICANT CHANGE UP (ref 3.5–5.3)
POTASSIUM SERPL-MCNC: 3.9 MMOL/L — SIGNIFICANT CHANGE UP (ref 3.5–5.3)
POTASSIUM SERPL-SCNC: 3.9 MMOL/L — SIGNIFICANT CHANGE UP (ref 3.5–5.3)
POTASSIUM SERPL-SCNC: 3.9 MMOL/L — SIGNIFICANT CHANGE UP (ref 3.5–5.3)
RBC # BLD: 4 M/UL — LOW (ref 4.2–5.8)
RBC # BLD: 4 M/UL — LOW (ref 4.2–5.8)
RBC # FLD: 13.4 % — SIGNIFICANT CHANGE UP (ref 10.3–14.5)
RBC # FLD: 13.4 % — SIGNIFICANT CHANGE UP (ref 10.3–14.5)
SODIUM SERPL-SCNC: 140 MMOL/L — SIGNIFICANT CHANGE UP (ref 135–145)
SODIUM SERPL-SCNC: 140 MMOL/L — SIGNIFICANT CHANGE UP (ref 135–145)
WBC # BLD: 6.23 K/UL — SIGNIFICANT CHANGE UP (ref 3.8–10.5)
WBC # BLD: 6.23 K/UL — SIGNIFICANT CHANGE UP (ref 3.8–10.5)
WBC # FLD AUTO: 6.23 K/UL — SIGNIFICANT CHANGE UP (ref 3.8–10.5)
WBC # FLD AUTO: 6.23 K/UL — SIGNIFICANT CHANGE UP (ref 3.8–10.5)

## 2023-11-14 PROCEDURE — 99233 SBSQ HOSP IP/OBS HIGH 50: CPT

## 2023-11-14 RX ORDER — ENOXAPARIN SODIUM 100 MG/ML
70 INJECTION SUBCUTANEOUS
Qty: 1 | Refills: 0
Start: 2023-11-14 | End: 2023-12-13

## 2023-11-14 RX ADMIN — FINASTERIDE 5 MILLIGRAM(S): 5 TABLET, FILM COATED ORAL at 17:29

## 2023-11-14 RX ADMIN — POLYETHYLENE GLYCOL 3350 17 GRAM(S): 17 POWDER, FOR SOLUTION ORAL at 17:29

## 2023-11-14 RX ADMIN — HEPARIN SODIUM 1700 UNIT(S)/HR: 5000 INJECTION INTRAVENOUS; SUBCUTANEOUS at 08:34

## 2023-11-14 RX ADMIN — Medication 250 MILLIGRAM(S): at 17:30

## 2023-11-14 RX ADMIN — ATORVASTATIN CALCIUM 40 MILLIGRAM(S): 80 TABLET, FILM COATED ORAL at 20:13

## 2023-11-14 RX ADMIN — Medication 650 MILLIGRAM(S): at 21:29

## 2023-11-14 RX ADMIN — HEPARIN SODIUM 1700 UNIT(S)/HR: 5000 INJECTION INTRAVENOUS; SUBCUTANEOUS at 07:08

## 2023-11-14 RX ADMIN — HEPARIN SODIUM 1700 UNIT(S)/HR: 5000 INJECTION INTRAVENOUS; SUBCUTANEOUS at 19:25

## 2023-11-14 RX ADMIN — Medication 250 MILLIGRAM(S): at 05:08

## 2023-11-14 RX ADMIN — HEPARIN SODIUM 1700 UNIT(S)/HR: 5000 INJECTION INTRAVENOUS; SUBCUTANEOUS at 01:55

## 2023-11-14 RX ADMIN — Medication 650 MILLIGRAM(S): at 22:30

## 2023-11-14 RX ADMIN — TAMSULOSIN HYDROCHLORIDE 0.4 MILLIGRAM(S): 0.4 CAPSULE ORAL at 20:13

## 2023-11-14 NOTE — PROGRESS NOTE ADULT - PROBLEM SELECTOR PLAN 1
Patient acutely hypoxic to 70s/80s overnight and worsening tachycardia to 130s  - D-dimer > 5K   - Urgent CTA chest showing "Extensive bilateral pulmonary arterial pulmonary emboli with involvement of all lobes... Evidence of right heart strain with RV/LV"  - Cont heparin gtt, transition to lovenox tomorrow if ok with hemeonc given malignancy history  - PERT team evaluation done no intervention  - Trops checked, downtrending from admission  - TTE showed  Left ventricular systolic function is mildly decreased with an ejection fraction. Global left ventricular hypokinesis. Severely enlarged right ventricular cavity size and severely reduced systolic function. Moderate pulmonary hypertension.  Moderate to large pericardial effusion. No echocardiographic evidence of cardiac tamponade.  - Monitor on tele/, q4h vital signs   - O2 as needed, currently on 2-3L NC   - b/l LE dopplers showed No evidence of deep venous thrombosis in either lower extremity.  -f/u cards recs

## 2023-11-14 NOTE — DISCHARGE NOTE PROVIDER - NSDCMRMEDTOKEN_GEN_ALL_CORE_FT
lorlatinib 100 mg oral tablet: 1 tab(s) orally once a day  Lovenox 30 mg/0.3 mL injectable solution: 70 milligram(s) subcutaneously every 12 hours  rosuvastatin 10 mg oral tablet: 1 tab(s) orally once a day  tamsulosin 0.4 mg oral capsule: 1 cap(s) orally once a day (at bedtime)   lorlatinib 100 mg oral tablet: 1 tab(s) orally once a day  rosuvastatin 10 mg oral tablet: 1 tab(s) orally once a day  tamsulosin 0.4 mg oral capsule: 1 cap(s) orally once a day (at bedtime)   Eliquis Starter Pack for Treatment of DVT and PE 5 mg oral tablet: 1 tab(s) orally 2 times a day Take as instructed on the started pack  lorlatinib 100 mg oral tablet: 1 tab(s) orally once a day  rosuvastatin 10 mg oral tablet: 1 tab(s) orally once a day  tamsulosin 0.4 mg oral capsule: 1 cap(s) orally once a day (at bedtime)   Eliquis Starter Pack for Treatment of DVT and PE 5 mg oral tablet: 1 tab(s) orally 2 times a day Take as instructed on the started pack  lorlatinib 100 mg oral tablet: 1 tab(s) orally once a day  rosuvastatin 10 mg oral tablet: 1 tab(s) orally once a day  senna leaf extract oral tablet: 2 tab(s) orally once a day (at bedtime)  tamsulosin 0.4 mg oral capsule: 1 cap(s) orally once a day (at bedtime)   Eliquis Starter Pack for Treatment of DVT and PE 5 mg oral tablet: 1 tab(s) orally 2 times a day Take as instructed on the started pack  finasteride 5 mg oral tablet: 1 tab(s) orally once a day  lorlatinib 100 mg oral tablet: 1 tab(s) orally once a day  rosuvastatin 10 mg oral tablet: 1 tab(s) orally once a day  senna leaf extract oral tablet: 2 tab(s) orally once a day (at bedtime)  tamsulosin 0.4 mg oral capsule: 1 cap(s) orally once a day (at bedtime)

## 2023-11-14 NOTE — DISCHARGE NOTE PROVIDER - CARE PROVIDER_API CALL
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  He was admitted on 10/24/2023. He was admitted here from 8/30 to 9/11 with adhesive capsulitis of the left shoulder and alcohol withdrawal.  During that hospitalization he was also found to have new alcoholic cardiomyopathy with ejection fraction 35 to 40%.  Upon discharge home he said he was able to walk 100 feet but got quite tired.  He thought he would get better at home but has slowly continued to worsen.  He has had progressive dyspnea on exertion.  He said he could maybe walk 25 steps now before having to stop and rest.  He gets short of breath just while dressing.  He did not feel he could go up or down stairs due to weakness and dyspnea.  He has noticed some increased firmness of his abdomen but no definite swelling.  He has not noticed any lower extremity swelling and has no pain to the abdomen or chest.  His left shoulder pain is resolved since steroid injection.  His appetite is good but he is having difficulty preparing his food as he is not able to stand at the stove and therefore has not been eating much.  His daughter requested a welfare check on him and he was brought in the hospital for further evaluation and treatment.  He said he has been sober for 5 weeks since his previous hospitalization. In the ER, his vital signs were stable.  Bilirubin is 8.2 and AST is 206.  These are both  stable from previous hospitalization.  Potassium 3.2 and albumin 2.7.  Chest x-ray is unremarkable.  Bedside ultrasound by ER physician showed ascites.  He is admitted for failure to thrive, severe dyspnea on exertion, new ascites secondary to decompensated end-stage liver disease, possible congstive heart failure, and severe generalized weakness.           Severe dyspnea on exertion and ascites/new diagnosis of decompensated liver disease/ cirrhosis/ congestive heart failure  The patient had an Ultrasound in August revealed evidence of cirrhosis.  Bilirubin is 8.2 and it was 8.4 at the beginning of September. His AST is also slightly improved at 206 from 239 during recent hospitalization. Seems most likely that progressive ascites resulting in decreased diaphragmatic excursion and dyspnea on exertion. Could be due to his ascites but also congstive heart failure.  His BNP was only 398.  CXR showed low volumes.  ON US bedside in the emergency room, he was found to have significant ascites. On 10/25 did get a paracentesis and rule out Spontaneous bacterial peritonitis. Had over 5 liters removed.   Will need to be on diuretics, this should help with ascites as well as congstive heart failure.  His blood pressure though is on the low side so his home coreg and ace inhibitor need to be stopped.  Gastroenterology has seen.   MELD 21, patient with some dizziness with standing.  Blood pressure has been down to the 80s.  Therapy seeing and will need TCU. His breathing is improved and he is on room air.      Failure to thrive/malnutrition with hypoalbuminemia and generalized weakness:  Albumin only 2.5.  Seems primarily secondary to dyspnea exertion but he also describes some element of generalized weakness.   He has not been eating as he said he only has food in his house that need preparation and he has not felt up to preparing food.  He does say he has a good appetite. Social work, physical therapy, Occupational Therapy  evaluations to help with needs assessment.  Physical therapy recommends TCU.       3. Acute on chronic systolic Heart failure/ coronary artery disease/ hypotension:  Patient has a known reduced ejection fraction of 35%.  This is likely due to a combination of ischemic and alcohol related heart disease.  This was a new diagnosis on his recent hospitalization.  He previously had 2 stents in the LAD. He had a mildly elevated troponin during the hospitalization. Echocardiogram revealed ejection fraction of 35 to 40% with inferior wall motion abnormality. Nuclear stress test confirmed an inferior infarct which was not reversible. He was deemed high risk for intervention with likely minimal benefit given the fixed defect.Medical management was recommended.  Troponin 24, down from 32 at last hospitalization.  He has been on lisinopril and coreg but his blood pressure is running in the 80s so these will be held to allow for diuresis. Is on asa.  Monitor intake, output and weights.  Continue on aldactone/lasix will cut his aldactone dose due to hypotension.   Bmp showing a creatinine 0.76, potassium 2.6, sodium 134 on 10/26/23        4.  Hypokalemia  Replace with close monitoring while on Aldactone and Lasix. Potassium today is 3.6, ace inhibitor is held.      5.  Diabetes mellitus type 2, non insulin dependent:   BS has been . Is on glipizide and an insulin correction scale     6.  Alcohol dependence:   He reports being abstinent since his last hospitalization, sober for 3 weeks.  He says he has not felt like drinking alcohol. Sobriety was discussed again by me.      7.  Obstructive sleep apnea              Is noncompliant with CPAP, has not uses since 2014     8.. left toe wound              WOCN to see    Assessment:      Areas visualized during today's visit:  left foot    Wound location: left distal great toe    Last photo: 10/26/23  Wound due to: Trauma  Wound history/plan of care: per pt wound happened while he  was trying to cut thickened great toenail and he nicked the skin, also has an intact scab to Left 4th toe from same time  Wound base: 100 % dermis, dark/dried blood is stained on the nail not part of wound     Palpation of the wound bed: normal      Drainage: scant     Description of drainage: serosanguinous     Measurements (length x width x depth, in cm): 0.5  x 1  x  0.1 cm      Tunneling: N/A     Undermining: N/A  Periwound skin: Intact      Color: normal and consistent with surrounding tissue      Temperature: normal   Odor: none  Pain: denies , none  Pain interventions prior to dressing change: patient tolerated well  Treatment goal: Heal , Maintain (prevention of deterioration), and Protection  STATUS: initial assessment  Supplies ordered: ordered iodosorb gel, supplies stored on unit, discussed with RN, and discussed with patient        Treatment Plan:     Left great toe wound(s): Every other dayand PRN when soiled or falls off  Spray with wound cleanser and gently pat dry  Apply small amount of iodosorb gel ([id number]) on middle of 4 x 5 cm mepilex ([id number]) and place over wound  If mepilex is not sticking can use bandaid instead or secure further with tape   Keep socks on at all times to protect toes    Orders: Written    RECOMMEND PRIMARY TEAM ORDER: Podiatry consult outpatient for nail cares and follow-up on wound  Education provided: plan of care and wound progress  Discussed plan of care with: Patient and Nurse  WOC nurse follow-up plan: weekly  Notify WOC if wound(s) deteriorate.  Nursing to notify the Provider(s) and re-consult the WOC Nurse if new skin concern.    DATA:     Current support surface: Standard  Standard gel/foam mattress (IsoFlex, Atmos air, etc)  Containment of urine/stool: Incontinent pad in bed  BMI: Body mass index is 26.76 kg/m .   Active diet order: Orders Placed This Encounter      Low Saturated Fat Na <2400 mg      Diet     Output: I/O last 3 completed shifts:  In: 800  [P.O.:800]  Out: 350 [Urine:350]     Labs:   Recent Labs   Lab 10/25/23  1043 10/24/23  1632 10/24/23  1422   ALBUMIN  --  2.5* 2.7*   HGB 12.8*  --  14.0   INR  --   --  1.33*   WBC 8.0  --  9.4   A1C  --   --  5.7*     Pressure injury risk assessment:   Sensory Perception: 3-->slightly limited  Moisture: 4-->rarely moist  Activity: 3-->walks occasionally  Mobility: 3-->slightly limited  Nutrition: 3-->adequate  Friction and Shear: 2-->potential problem  Aristeo Score: 18    Nani Cheema RN CWOCN  Pager no longer is use, please contact through [employer] group: [facility] Nurse [facility]  Dept. Office Number: [phone]    Diego Franks  Cardiovascular Disease  [address] [place] [address]  Phone: [phone]  Fax: [phone]  Follow Up Time: 1 week    Urology follow up 1 week,   Phone: (   )    -  Fax: (   )    -  Follow Up Time:

## 2023-11-14 NOTE — DISCHARGE NOTE PROVIDER - PROVIDER TOKENS
PROVIDER:[TOKEN:[[id number]],FOLLOWUP:[1 week]],FREE:[LAST:[Urology follow up 1 week],PHONE:[(   )    -],FAX:[(   )    -]]

## 2023-11-14 NOTE — DISCHARGE NOTE PROVIDER - NSDCFUSCHEDAPPT_GEN_ALL_CORE_FT
Diego Franks  [facility] Physician [facility]  CARDIOLOGY [address]  Scheduled Appointment: 11/21/2023

## 2023-11-14 NOTE — DISCHARGE NOTE PROVIDER - HOSPITAL COURSE
64 y.o male with hx of testicular cancer s/p orchectomy, lung adenocarcinoma with brain mets on lorlatinib, BPH, HLD admitted with KAMI 2/2 bilateral hydroureteronephrosis. Course c/b hypoxia/tachycardia found to have extensive bilateral PE.        Problem/Plan - 1:  ·  Problem: Acute pulmonary embolism with acute cor pulmonale.   ·  Plan: Patient acutely hypoxic to 70s/80s overnight and worsening tachycardia to 130s  - D-dimer > 5K   - Urgent CTA chest showing "Extensive bilateral pulmonary arterial pulmonary emboli with involvement of all lobes... Evidence of right heart strain with RV/LV"  - Cont heparin gtt, transition to lovenox tomorrow if ok with hemeonc given malignancy history  - PERT team evaluation done no intervention  - Trops checked, downtrending from admission  - TTE showed  Left ventricular systolic function is mildly decreased with an ejection fraction. Global left ventricular hypokinesis. Severely enlarged right ventricular cavity size and severely reduced systolic function. Moderate pulmonary hypertension.  Moderate to large pericardial effusion. No echocardiographic evidence of cardiac tamponade.  - Monitor on tele/, q4h vital signs   - O2 as needed, currently on 2-3L NC   - b/l LE dopplers showed No evidence of deep venous thrombosis in either lower extremity.  -f/u cards recs.     Problem/Plan - 2:  ·  Problem: KAMI (acute kidney injury).   ·  Plan: creat 2.84 from baseline 1.0, now improving  - CT abdomen with bilateral hydronephrosis  - Postrenal KAMI s/p Christopher catheter placed by Urology with 1L of urine output  - Monitor BMP  -TOV tonight if ok with urology.     Problem/Plan - 3:  ·  Problem: Obstructive uropathy.   ·  Plan: Moderate bilateral hydroureteronephrosis, distended urinary bladder and enlarged prostate gland.  -s/p Christopher catheter placement by Urology  -c/w home tamsulosin  -cont proscar per Urology  - Maintain strict intake and output   - Patient was started on Cipro by outpatient oncologist for concern for UTI, will continue (dose reduced due to KAMI)  - Keep Crhistopher for at least 5 days per Urology, TOV tonight if ok with urology  -Patient will follow-up with established outpatient Urologist, hs an appointment next Monday 11/13, however pt may need to reschedule as pt in the hospital now.     Problem/Plan - 4:  ·  Problem: Adenocarcinoma of lung.   ·  Plan: diagnosed in 2009 s/p resection followed by CRT with subsequent metastatic spread currently on targeted therapy with Lorbrena (lorlatinib)  - Follows with Dr. Nissa Luna at [facility].   - S/p gamma knife for brain metse   - Onc recs appreciated - - On treatment with lorlatinib, hold for now given KAMI. May resume upon discharge.     Problem/Plan - 5:  ·  Problem: HLD (hyperlipidemia).   ·  Plan: -c/w home statin.     Problem/Plan - 6:  ·  Problem: Prophylactic measure.   ·  Plan: DVT ppx: on heparin gtt  Dispo: dc planning for tomorrow if stable and if no objection with cards    Plan discussed with ACP.       64 y.o male with hx of testicular cancer s/p orchectomy, lung adenocarcinoma with brain mets on lorlatinib, BPH, HLD admitted with KAMI 2/2 bilateral hydroureteronephrosis. Course c/b hypoxia/tachycardia found to have extensive bilateral PE.        Acute pulmonary embolism with acute cor pulmonale.   Patient acutely hypoxic to 70s/80s overnight and worsening tachycardia to 130s  - D-dimer > 5K   - Urgent CTA chest showing "Extensive bilateral pulmonary arterial pulmonary emboli with involvement of all lobes... Evidence of right heart strain with RV/LV"  - Cont heparin gtt, transition to doacs  - PERT team evaluation done no intervention  - Trops checked, downtrending from admission  - TTE showed  Left ventricular systolic function is mildly decreased with an ejection fraction. Global left ventricular hypokinesis. Severely enlarged right ventricular cavity size and severely reduced systolic function. Moderate pulmonary hypertension.  Moderate to large pericardial effusion. No echocardiographic evidence of cardiac tamponade.  - Monitor on tele/, q4h vital signs   - O2 as needed, currently on 2-3L NC   - b/l LE dopplers showed No evidence of deep venous thrombosis in either lower extremity.  -f/u cards recs.     KAMI (acute kidney injury).   creat 2.84 from baseline 1.0, now improving  - CT abdomen with bilateral hydronephrosis  - Postrenal KAMI s/p Jeronimo catheter placed by Urology with 1L of urine output  - Monitor BMP  -TOV tonight if ok with urology.    Obstructive uropathy.   · Moderate bilateral hydroureteronephrosis, distended urinary bladder and enlarged prostate gland.  -s/p Jeronimo catheter placement by Urology  -c/w home tamsulosin  -cont proscar per Urology  - Maintain strict intake and output   - Patient was started on Cipro by outpatient oncologist for concern for UTI, will continue (dose reduced due to KAMI)  - Keep Jeronimo for at least 5 days per Urology, jeronimo removed, failed tov, jeronimo reinserted, advised to f/u as outpt  -Patient will follow-up with established outpatient Urologist     Adenocarcinoma of lung.   diagnosed in 2009 s/p resection followed by CRT with subsequent metastatic spread currently on targeted therapy with Lorbrena (lorlatinib)  - Follows with Dr. Nissa Luna at [facility].   - S/p gamma knife for brain metse   - Onc recs appreciated - - On treatment with lorlatinib, hold for now given KAMI. May resume upon discharge.     Problem/Plan - 5:  ·  Problem: HLD (hyperlipidemia).   ·  Plan: -c/w home statin.        64 y.o male with hx of testicular cancer s/p orchectomy, lung adenocarcinoma with brain mets on lorlatinib, BPH, HLD admitted with KAMI 2/2 bilateral hydroureteronephrosis. Course c/b hypoxia/tachycardia found to have extensive bilateral PE.        Acute pulmonary embolism with acute cor pulmonale.   Patient acutely hypoxic to 70s/80s overnight and worsening tachycardia to 130s  - D-dimer > 5K   - Urgent CTA chest showing "Extensive bilateral pulmonary arterial pulmonary emboli with involvement of all lobes... Evidence of right heart strain with RV/LV"  - Cont heparin gtt, transition to doacs  - PERT team evaluation done no intervention  - Trops checked, downtrending from admission  - TTE showed  Left ventricular systolic function is mildly decreased with an ejection fraction. Global left ventricular hypokinesis. Severely enlarged right ventricular cavity size and severely reduced systolic function. Moderate pulmonary hypertension.  Moderate to large pericardial effusion. No echocardiographic evidence of cardiac tamponade.  - Monitor on tele/, q4h vital signs   - O2 as needed, currently on 2-3L NC   - b/l LE dopplers showed No evidence of deep venous thrombosis in either lower extremity.  -f/u cards recs.     KAMI (acute kidney injury).   creat 2.84 from baseline 1.0, now improving  - CT abdomen with bilateral hydronephrosis  - Postrenal KAMI s/p Jeronimo catheter placed by Urology with 1L of urine output  - Monitor BMP  -failed tov and jeronimo had to be replaced, pt to be dc with jeronimo, adv f/u with urology as outpt    Obstructive uropathy.   · Moderate bilateral hydroureteronephrosis, distended urinary bladder and enlarged prostate gland.  -s/p Jeronimo catheter placement by Urology  -c/w home tamsulosin  -cont proscar per Urology  - Maintain strict intake and output   - Patient was started on Cipro by outpatient oncologist for concern for UTI, will continue (dose reduced due to KAMI)  - Keep Jeronimo for at least 5 days per Urology, jeronimo removed, failed tov, jeronimo reinserted, advised to f/u as outpt  -Patient will follow-up with established outpatient Urologist     Adenocarcinoma of lung.   diagnosed in 2009 s/p resection followed by CRT with subsequent metastatic spread currently on targeted therapy with Lorbrena (lorlatinib)  - Follows with Dr. Nissa Luna at [facility].   - S/p gamma knife for brain metse   - Onc recs appreciated - - On treatment with lorlatinib, hold for now given KAMI. May resume upon discharge.      HLD (hyperlipidemia).   -c/w home statin.  stable for dc

## 2023-11-14 NOTE — PROGRESS NOTE ADULT - PROBLEM SELECTOR PLAN 6
DVT ppx: on heparin gtt  Dispo: TBD pending clinical course    Plan discussed with ACP DVT ppx: on heparin gtt  Dispo: dc planning for tomorrow if stable and if no objection with cards    Plan discussed with ACP

## 2023-11-14 NOTE — DISCHARGE NOTE PROVIDER - NSDCCPCAREPLAN_GEN_ALL_CORE_FT
PRINCIPAL DISCHARGE DIAGNOSIS  Diagnosis: Acute pulmonary embolism with acute cor pulmonale  Assessment and Plan of Treatment: you were given heparin in the hospital, take lovenox as prescribed and f/u with cardiologist and oncologist as outpt      SECONDARY DISCHARGE DIAGNOSES  Diagnosis: KAMI (acute kidney injury)  Assessment and Plan of Treatment: you had kidney problem , had jeronimo catheter in the hospital, f/u with urologist as outpt    Diagnosis: Adenocarcinoma of lung  Assessment and Plan of Treatment: f/u with your oncology doctor as outpt     PRINCIPAL DISCHARGE DIAGNOSIS  Diagnosis: Acute pulmonary embolism with acute cor pulmonale  Assessment and Plan of Treatment: you were given heparin in the hospital, take blood thiner medication as prescribed and f/u with cardiologist and oncologist as outpt      SECONDARY DISCHARGE DIAGNOSES  Diagnosis: KAMI (acute kidney injury)  Assessment and Plan of Treatment: you had kidney problem , had jeronimo catheter, f/u with urologist as outpt    Diagnosis: Adenocarcinoma of lung  Assessment and Plan of Treatment: f/u with your oncology doctor as outpt

## 2023-11-15 PROBLEM — N40.0 BENIGN PROSTATIC HYPERPLASIA WITHOUT LOWER URINARY TRACT SYMPTOMS: Chronic | Status: ACTIVE | Noted: 2023-11-10

## 2023-11-15 PROBLEM — C34.90 MALIGNANT NEOPLASM OF UNSPECIFIED PART OF UNSPECIFIED BRONCHUS OR LUNG: Chronic | Status: ACTIVE | Noted: 2023-11-10

## 2023-11-15 PROBLEM — C62.90 MALIGNANT NEOPLASM OF UNSPECIFIED TESTIS, UNSPECIFIED WHETHER DESCENDED OR UNDESCENDED: Chronic | Status: ACTIVE | Noted: 2023-11-10

## 2023-11-15 PROBLEM — E78.5 HYPERLIPIDEMIA, UNSPECIFIED: Chronic | Status: ACTIVE | Noted: 2023-11-10

## 2023-11-15 LAB
ANION GAP SERPL CALC-SCNC: 11 MMOL/L — SIGNIFICANT CHANGE UP (ref 7–14)
ANION GAP SERPL CALC-SCNC: 11 MMOL/L — SIGNIFICANT CHANGE UP (ref 7–14)
APTT BLD: 74.9 SEC — HIGH (ref 24.5–35.6)
APTT BLD: 74.9 SEC — HIGH (ref 24.5–35.6)
BLD GP AB SCN SERPL QL: NEGATIVE — SIGNIFICANT CHANGE UP
BLD GP AB SCN SERPL QL: NEGATIVE — SIGNIFICANT CHANGE UP
BUN SERPL-MCNC: 21 MG/DL — SIGNIFICANT CHANGE UP (ref 7–23)
BUN SERPL-MCNC: 21 MG/DL — SIGNIFICANT CHANGE UP (ref 7–23)
CALCIUM SERPL-MCNC: 9.5 MG/DL — SIGNIFICANT CHANGE UP (ref 8.4–10.5)
CALCIUM SERPL-MCNC: 9.5 MG/DL — SIGNIFICANT CHANGE UP (ref 8.4–10.5)
CHLORIDE SERPL-SCNC: 102 MMOL/L — SIGNIFICANT CHANGE UP (ref 98–107)
CHLORIDE SERPL-SCNC: 102 MMOL/L — SIGNIFICANT CHANGE UP (ref 98–107)
CO2 SERPL-SCNC: 28 MMOL/L — SIGNIFICANT CHANGE UP (ref 22–31)
CO2 SERPL-SCNC: 28 MMOL/L — SIGNIFICANT CHANGE UP (ref 22–31)
CREAT SERPL-MCNC: 1.53 MG/DL — HIGH (ref 0.5–1.3)
CREAT SERPL-MCNC: 1.53 MG/DL — HIGH (ref 0.5–1.3)
CULTURE RESULTS: SIGNIFICANT CHANGE UP
EGFR: 50 ML/MIN/1.73M2 — LOW
EGFR: 50 ML/MIN/1.73M2 — LOW
GLUCOSE SERPL-MCNC: 99 MG/DL — SIGNIFICANT CHANGE UP (ref 70–99)
GLUCOSE SERPL-MCNC: 99 MG/DL — SIGNIFICANT CHANGE UP (ref 70–99)
HCT VFR BLD CALC: 41.2 % — SIGNIFICANT CHANGE UP (ref 39–50)
HCT VFR BLD CALC: 41.2 % — SIGNIFICANT CHANGE UP (ref 39–50)
HGB BLD-MCNC: 13 G/DL — SIGNIFICANT CHANGE UP (ref 13–17)
HGB BLD-MCNC: 13 G/DL — SIGNIFICANT CHANGE UP (ref 13–17)
MAGNESIUM SERPL-MCNC: 2 MG/DL — SIGNIFICANT CHANGE UP (ref 1.6–2.6)
MAGNESIUM SERPL-MCNC: 2 MG/DL — SIGNIFICANT CHANGE UP (ref 1.6–2.6)
MCHC RBC-ENTMCNC: 28.3 PG — SIGNIFICANT CHANGE UP (ref 27–34)
MCHC RBC-ENTMCNC: 28.3 PG — SIGNIFICANT CHANGE UP (ref 27–34)
MCHC RBC-ENTMCNC: 31.6 GM/DL — LOW (ref 32–36)
MCHC RBC-ENTMCNC: 31.6 GM/DL — LOW (ref 32–36)
MCV RBC AUTO: 89.6 FL — SIGNIFICANT CHANGE UP (ref 80–100)
MCV RBC AUTO: 89.6 FL — SIGNIFICANT CHANGE UP (ref 80–100)
NRBC # BLD: 0 /100 WBCS — SIGNIFICANT CHANGE UP (ref 0–0)
NRBC # BLD: 0 /100 WBCS — SIGNIFICANT CHANGE UP (ref 0–0)
NRBC # FLD: 0 K/UL — SIGNIFICANT CHANGE UP (ref 0–0)
NRBC # FLD: 0 K/UL — SIGNIFICANT CHANGE UP (ref 0–0)
PHOSPHATE SERPL-MCNC: 2.5 MG/DL — SIGNIFICANT CHANGE UP (ref 2.5–4.5)
PHOSPHATE SERPL-MCNC: 2.5 MG/DL — SIGNIFICANT CHANGE UP (ref 2.5–4.5)
PLATELET # BLD AUTO: 137 K/UL — LOW (ref 150–400)
PLATELET # BLD AUTO: 137 K/UL — LOW (ref 150–400)
POTASSIUM SERPL-MCNC: 3.4 MMOL/L — LOW (ref 3.5–5.3)
POTASSIUM SERPL-MCNC: 3.4 MMOL/L — LOW (ref 3.5–5.3)
POTASSIUM SERPL-SCNC: 3.4 MMOL/L — LOW (ref 3.5–5.3)
POTASSIUM SERPL-SCNC: 3.4 MMOL/L — LOW (ref 3.5–5.3)
RBC # BLD: 4.6 M/UL — SIGNIFICANT CHANGE UP (ref 4.2–5.8)
RBC # BLD: 4.6 M/UL — SIGNIFICANT CHANGE UP (ref 4.2–5.8)
RBC # FLD: 13.4 % — SIGNIFICANT CHANGE UP (ref 10.3–14.5)
RBC # FLD: 13.4 % — SIGNIFICANT CHANGE UP (ref 10.3–14.5)
RH IG SCN BLD-IMP: POSITIVE — SIGNIFICANT CHANGE UP
RH IG SCN BLD-IMP: POSITIVE — SIGNIFICANT CHANGE UP
SODIUM SERPL-SCNC: 141 MMOL/L — SIGNIFICANT CHANGE UP (ref 135–145)
SODIUM SERPL-SCNC: 141 MMOL/L — SIGNIFICANT CHANGE UP (ref 135–145)
SPECIMEN SOURCE: SIGNIFICANT CHANGE UP
WBC # BLD: 6.94 K/UL — SIGNIFICANT CHANGE UP (ref 3.8–10.5)
WBC # BLD: 6.94 K/UL — SIGNIFICANT CHANGE UP (ref 3.8–10.5)
WBC # FLD AUTO: 6.94 K/UL — SIGNIFICANT CHANGE UP (ref 3.8–10.5)
WBC # FLD AUTO: 6.94 K/UL — SIGNIFICANT CHANGE UP (ref 3.8–10.5)

## 2023-11-15 PROCEDURE — 99233 SBSQ HOSP IP/OBS HIGH 50: CPT

## 2023-11-15 RX ORDER — APIXABAN 2.5 MG/1
1 TABLET, FILM COATED ORAL
Qty: 60 | Refills: 0
Start: 2023-11-15 | End: 2023-12-14

## 2023-11-15 RX ORDER — POTASSIUM CHLORIDE 20 MEQ
20 PACKET (EA) ORAL ONCE
Refills: 0 | Status: COMPLETED | OUTPATIENT
Start: 2023-11-15 | End: 2023-11-15

## 2023-11-15 RX ORDER — POTASSIUM CHLORIDE 20 MEQ
20 PACKET (EA) ORAL ONCE
Refills: 0 | Status: DISCONTINUED | OUTPATIENT
Start: 2023-11-15 | End: 2023-11-15

## 2023-11-15 RX ADMIN — HEPARIN SODIUM 1700 UNIT(S)/HR: 5000 INJECTION INTRAVENOUS; SUBCUTANEOUS at 19:24

## 2023-11-15 RX ADMIN — FINASTERIDE 5 MILLIGRAM(S): 5 TABLET, FILM COATED ORAL at 17:29

## 2023-11-15 RX ADMIN — HEPARIN SODIUM 1700 UNIT(S)/HR: 5000 INJECTION INTRAVENOUS; SUBCUTANEOUS at 07:16

## 2023-11-15 RX ADMIN — Medication 20 MILLIEQUIVALENT(S): at 21:20

## 2023-11-15 RX ADMIN — Medication 250 MILLIGRAM(S): at 05:07

## 2023-11-15 RX ADMIN — TAMSULOSIN HYDROCHLORIDE 0.4 MILLIGRAM(S): 0.4 CAPSULE ORAL at 21:21

## 2023-11-15 RX ADMIN — HEPARIN SODIUM 1700 UNIT(S)/HR: 5000 INJECTION INTRAVENOUS; SUBCUTANEOUS at 07:28

## 2023-11-15 RX ADMIN — ATORVASTATIN CALCIUM 40 MILLIGRAM(S): 80 TABLET, FILM COATED ORAL at 21:22

## 2023-11-15 RX ADMIN — HEPARIN SODIUM 1700 UNIT(S)/HR: 5000 INJECTION INTRAVENOUS; SUBCUTANEOUS at 21:25

## 2023-11-15 RX ADMIN — Medication 250 MILLIGRAM(S): at 17:29

## 2023-11-15 RX ADMIN — HEPARIN SODIUM 1700 UNIT(S)/HR: 5000 INJECTION INTRAVENOUS; SUBCUTANEOUS at 06:52

## 2023-11-15 NOTE — PROGRESS NOTE ADULT - NS ATTEND AMEND GEN_ALL_CORE FT
Discussed with pt doac vs lovenox he would prefer doac. No objections to discharge on eliquis 5mg bid. he will follow up with his oncologist for resumption of his Lorlatinib

## 2023-11-15 NOTE — PROGRESS NOTE ADULT - PROBLEM SELECTOR PLAN 6
DVT ppx: on heparin gtt transition to lovenox today, lovenox self injection education  Dispo: dc planning for tomorrow if stable and if no objection with cards    Hypokalemia- replete k    Plan discussed with ACP

## 2023-11-15 NOTE — PROGRESS NOTE ADULT - ATTENDING COMMENTS
personally saw and examined patient  labs and vital reviewed  agree with above assessment and plan  hemodynamically stable  change to lovenox today, plan for minimum 6 mo course  no plans for further cardiac testing  will need outpt cards follow up
pt seen and examined  labs and vitals reviewed  agree with above assessment and plan  feeling well, not on oxygen support  cont heparin drip  plan is for transition to lovenox  bp stable, HR stable, no syncope. dizziness  will need out pt vascular med follow up, should f/u with Dr Augustin Perez, [facility]  on d/c  will follow while here

## 2023-11-15 NOTE — PROGRESS NOTE ADULT - PROBLEM SELECTOR PLAN 1
Patient acutely hypoxic to 70s/80s overnight and worsening tachycardia to 130s  - D-dimer > 5K   - Urgent CTA chest showing "Extensive bilateral pulmonary arterial pulmonary emboli with involvement of all lobes... Evidence of right heart strain with RV/LV"  - Cont heparin gtt, transition to lovenox today  - PERT team evaluation done no intervention  - Trops checked, downtrending from admission  - TTE showed  Left ventricular systolic function is mildly decreased with an ejection fraction. Global left ventricular hypokinesis. Severely enlarged right ventricular cavity size and severely reduced systolic function. Moderate pulmonary hypertension.  Moderate to large pericardial effusion. No echocardiographic evidence of cardiac tamponade.  - Monitor on tele/, q4h vital signs   - O2 as needed, currently on 2-3L NC   - b/l LE dopplers showed No evidence of deep venous thrombosis in either lower extremity.  -f/u cards recs

## 2023-11-16 ENCOUNTER — TRANSCRIPTION ENCOUNTER (OUTPATIENT)
Age: 63
End: 2023-11-16

## 2023-11-16 VITALS
TEMPERATURE: 98 F | SYSTOLIC BLOOD PRESSURE: 121 MMHG | RESPIRATION RATE: 17 BRPM | OXYGEN SATURATION: 100 % | HEART RATE: 80 BPM | DIASTOLIC BLOOD PRESSURE: 77 MMHG

## 2023-11-16 LAB
ANION GAP SERPL CALC-SCNC: 11 MMOL/L — SIGNIFICANT CHANGE UP (ref 7–14)
ANION GAP SERPL CALC-SCNC: 11 MMOL/L — SIGNIFICANT CHANGE UP (ref 7–14)
APTT BLD: 75.9 SEC — HIGH (ref 24.5–35.6)
APTT BLD: 75.9 SEC — HIGH (ref 24.5–35.6)
BUN SERPL-MCNC: 18 MG/DL — SIGNIFICANT CHANGE UP (ref 7–23)
BUN SERPL-MCNC: 18 MG/DL — SIGNIFICANT CHANGE UP (ref 7–23)
CALCIUM SERPL-MCNC: 9.5 MG/DL — SIGNIFICANT CHANGE UP (ref 8.4–10.5)
CALCIUM SERPL-MCNC: 9.5 MG/DL — SIGNIFICANT CHANGE UP (ref 8.4–10.5)
CHLORIDE SERPL-SCNC: 102 MMOL/L — SIGNIFICANT CHANGE UP (ref 98–107)
CHLORIDE SERPL-SCNC: 102 MMOL/L — SIGNIFICANT CHANGE UP (ref 98–107)
CO2 SERPL-SCNC: 28 MMOL/L — SIGNIFICANT CHANGE UP (ref 22–31)
CO2 SERPL-SCNC: 28 MMOL/L — SIGNIFICANT CHANGE UP (ref 22–31)
CREAT SERPL-MCNC: 1.58 MG/DL — HIGH (ref 0.5–1.3)
CREAT SERPL-MCNC: 1.58 MG/DL — HIGH (ref 0.5–1.3)
EGFR: 49 ML/MIN/1.73M2 — LOW
EGFR: 49 ML/MIN/1.73M2 — LOW
GLUCOSE SERPL-MCNC: 97 MG/DL — SIGNIFICANT CHANGE UP (ref 70–99)
GLUCOSE SERPL-MCNC: 97 MG/DL — SIGNIFICANT CHANGE UP (ref 70–99)
HCT VFR BLD CALC: 35.4 % — LOW (ref 39–50)
HCT VFR BLD CALC: 35.4 % — LOW (ref 39–50)
HGB BLD-MCNC: 11.4 G/DL — LOW (ref 13–17)
HGB BLD-MCNC: 11.4 G/DL — LOW (ref 13–17)
MAGNESIUM SERPL-MCNC: 1.9 MG/DL — SIGNIFICANT CHANGE UP (ref 1.6–2.6)
MAGNESIUM SERPL-MCNC: 1.9 MG/DL — SIGNIFICANT CHANGE UP (ref 1.6–2.6)
MCHC RBC-ENTMCNC: 28.2 PG — SIGNIFICANT CHANGE UP (ref 27–34)
MCHC RBC-ENTMCNC: 28.2 PG — SIGNIFICANT CHANGE UP (ref 27–34)
MCHC RBC-ENTMCNC: 32.2 GM/DL — SIGNIFICANT CHANGE UP (ref 32–36)
MCHC RBC-ENTMCNC: 32.2 GM/DL — SIGNIFICANT CHANGE UP (ref 32–36)
MCV RBC AUTO: 87.6 FL — SIGNIFICANT CHANGE UP (ref 80–100)
MCV RBC AUTO: 87.6 FL — SIGNIFICANT CHANGE UP (ref 80–100)
NRBC # BLD: 0 /100 WBCS — SIGNIFICANT CHANGE UP (ref 0–0)
NRBC # BLD: 0 /100 WBCS — SIGNIFICANT CHANGE UP (ref 0–0)
NRBC # FLD: 0 K/UL — SIGNIFICANT CHANGE UP (ref 0–0)
NRBC # FLD: 0 K/UL — SIGNIFICANT CHANGE UP (ref 0–0)
PHOSPHATE SERPL-MCNC: 2.6 MG/DL — SIGNIFICANT CHANGE UP (ref 2.5–4.5)
PHOSPHATE SERPL-MCNC: 2.6 MG/DL — SIGNIFICANT CHANGE UP (ref 2.5–4.5)
PLATELET # BLD AUTO: 129 K/UL — LOW (ref 150–400)
PLATELET # BLD AUTO: 129 K/UL — LOW (ref 150–400)
POTASSIUM SERPL-MCNC: 4 MMOL/L — SIGNIFICANT CHANGE UP (ref 3.5–5.3)
POTASSIUM SERPL-MCNC: 4 MMOL/L — SIGNIFICANT CHANGE UP (ref 3.5–5.3)
POTASSIUM SERPL-SCNC: 4 MMOL/L — SIGNIFICANT CHANGE UP (ref 3.5–5.3)
POTASSIUM SERPL-SCNC: 4 MMOL/L — SIGNIFICANT CHANGE UP (ref 3.5–5.3)
RBC # BLD: 4.04 M/UL — LOW (ref 4.2–5.8)
RBC # BLD: 4.04 M/UL — LOW (ref 4.2–5.8)
RBC # FLD: 13.2 % — SIGNIFICANT CHANGE UP (ref 10.3–14.5)
RBC # FLD: 13.2 % — SIGNIFICANT CHANGE UP (ref 10.3–14.5)
SODIUM SERPL-SCNC: 141 MMOL/L — SIGNIFICANT CHANGE UP (ref 135–145)
SODIUM SERPL-SCNC: 141 MMOL/L — SIGNIFICANT CHANGE UP (ref 135–145)
WBC # BLD: 6.52 K/UL — SIGNIFICANT CHANGE UP (ref 3.8–10.5)
WBC # BLD: 6.52 K/UL — SIGNIFICANT CHANGE UP (ref 3.8–10.5)
WBC # FLD AUTO: 6.52 K/UL — SIGNIFICANT CHANGE UP (ref 3.8–10.5)
WBC # FLD AUTO: 6.52 K/UL — SIGNIFICANT CHANGE UP (ref 3.8–10.5)

## 2023-11-16 PROCEDURE — 99239 HOSP IP/OBS DSCHRG MGMT >30: CPT

## 2023-11-16 RX ORDER — SENNA PLUS 8.6 MG/1
2 TABLET ORAL
Qty: 0 | Refills: 0 | DISCHARGE
Start: 2023-11-16

## 2023-11-16 RX ORDER — FINASTERIDE 5 MG/1
1 TABLET, FILM COATED ORAL
Qty: 30 | Refills: 0
Start: 2023-11-16 | End: 2023-12-15

## 2023-11-16 RX ORDER — APIXABAN 2.5 MG/1
10 TABLET, FILM COATED ORAL EVERY 12 HOURS
Refills: 0 | Status: DISCONTINUED | OUTPATIENT
Start: 2023-11-16 | End: 2023-11-16

## 2023-11-16 RX ADMIN — FINASTERIDE 5 MILLIGRAM(S): 5 TABLET, FILM COATED ORAL at 13:23

## 2023-11-16 RX ADMIN — HEPARIN SODIUM 1700 UNIT(S)/HR: 5000 INJECTION INTRAVENOUS; SUBCUTANEOUS at 07:36

## 2023-11-16 RX ADMIN — APIXABAN 10 MILLIGRAM(S): 2.5 TABLET, FILM COATED ORAL at 13:22

## 2023-11-16 RX ADMIN — Medication 250 MILLIGRAM(S): at 05:56

## 2023-11-16 RX ADMIN — Medication 650 MILLIGRAM(S): at 07:47

## 2023-11-16 RX ADMIN — Medication 650 MILLIGRAM(S): at 06:47

## 2023-11-16 NOTE — PROGRESS NOTE ADULT - PROBLEM SELECTOR PROBLEM 3
Obstructive uropathy
Elevated troponin
Obstructive uropathy

## 2023-11-16 NOTE — PROGRESS NOTE ADULT - ASSESSMENT
64 y.o male with hx of testicular cancer s/p orchectomy, lung adenocarcinoma with brain mets on lorlatinib, BPH, HLD admitted with KAMI 2/2 bilateral hydroureteronephrosis
CORBY CRUZ is a 63y Male who presents with a chief complaint of KAMI    Metastatic Lung Cancer  ·	Patient follows with Dr. Nissa Luna, [facility].  ·	Currently on oral lorlatinib; on hold at this time given acute hospital issues.  ·	No other systemic therapy planned inpatient or during rehabilitation    Acute Kidney Injury  ·	Imaging showed moderate bilateral hydroureteronephrosis.  ·	Urology placed jeronimo catheter  ·	Creatinine worsened to 1.9 today.  ·	Continue to monitor closely.    Venous Thromboembolism  ·	Etiology unclear, though does have metastatic malignancy.  ·	Likely indefinite anticoagulation.  ·	Continue heparin drip at this time. Switch to direct oral anticoagulant when no further procedures are planned.    Anemia  Thrombocytopenia  ·	Worsened during hospitalization; likely reactive to inflammation.  ·	Monitor and maintain HGB > 7, PLT > 10  ·	Low threshold for workup if there is continued decrease    Will continue to follow.    Brenden Sapp MD  Hematology/Oncology  O: [phone]
64 y.o male with hx of testicular cancer s/p orchectomy, lung adenocarcinoma with brain mets on lorlatinib, BPH, HLD admitted with KAMI 2/2 bilateral hydroureteronephrosis. Course c/b hypoxia/tachycardia found to have extensive bilateral PE. 
64 y.o male with hx of testicular cancer s/p orchectomy, lung adenocarcinoma with brain mets on lorlatinib, BPH, HLD admitted with KAMI 2/2 bilateral hydroureteronephrosis. Course c/b hypoxia/tachycardia found to have extensive bilateral PE. 
Echo:  11/13/23   1. Left ventricular cavity is normal. Left ventricular wall thickness is normal. The interventricular septum is flattened in systole consistent with right ventricular pressure overload. Left ventricular systolic function is mildly decreased with an ejection fraction visually estimated at 45 to 50 %. Global left ventricular hypokinesis.   2. Severely enlarged right ventricular cavity size and severely reduced systolic function.   3. Structurally normal mitral valve with normal leaflet excursion. There is calcification of the mitral valve annulus. There is trace mitral regurgitation.   4. The aortic valve appears trileaflet with normal systolic excursion. There is calcification of the aortic valve leaflets. There is mild aortic regurgitation.   5. The right atrium is moderately dilated in size with an indexed volume of 35.69 ml/m² and an indexed area of 10.45 cm²/m².   6. Estimated pulmonary artery systolic pressure is 53 mmHg, consistent with moderate pulmonary hypertension.   7. Moderate to large pericardial effusion, measuring ~ 1.9 cm adjacent to the distal right ventricular free wall (best seen in the apical view). No echocardiographic evidence of cardiac tamponade.   8. No prior echocardiogram is available for comparison.    CTPA 11/12/23  Pulmonary arterial emboli within the distal aspects of the bilateral central pulmonary arteries extending into the bilateral upper lobe and the left lower lobe lobar, segmental and subsegmental airways with involvement of the interlobar pulmonary arteries. Pulmonary embolus extends into the right middle lobar pulmonary artery. Evidence of right heart strain with RV/LV ratio of 2.0 with associated flattening of the interventricular septum.    Interpretation of Telemetry: sinus 90s    64M PMH testicular cancer s/p orchectomy, lung adenocarcinoma with brain mets on lorlatinib, BPH, HLD admitted with SOB and abdominal distension found to have bilateral hydroureteronephrosis now s/p Christopher catheterization. Patient and wife reporting episodes of presyncope vs syncopal events. Found on admission to have extensive bilateral PEs in setting of hypoxic episode. Reduced RV function noted on TTE and CTPA. Discussed with Dr. Vergara re: risks/benefits of catheter-based intervention. Would not consider full or partially-dosed tPA in setting of brain metastases. For now recommend conservative management with heparin gtt. If remains HD stable over next 48 hours will transition to lovenox.       Recommendations:   - continue heparin gtt  - cont telemetry monitoring  - if remains HD stable in next 48 hours can transition to lovenox for anticoagulation in setting of malignancy  - repeat TTE in 1 month for incidental pericardial effusion      All recommendations pending attending attestation. We will continue to follow with you.     Marissa Stoner MD  PGY-4, Cardiology  Available on TEAMS    For all new consults  [email or web address]  Login: cardfellows  
CORBY CRUZ is a 63y Male who presents with a chief complaint of KAMI    Metastatic Lung Cancer  ·	Patient follows with Dr. Nissa Luna, [facility].  ·	Currently on oral lorlatinib; on hold at this time given acute hospital issues.  ·	No other systemic therapy planned inpatient or during rehabilitation  ·	Follow up with Dr. Luna after discharge to discuss resuming rx.     Acute Kidney Injury  ·	Imaging showed moderate bilateral hydroureteronephrosis.  ·	Urology placed jeronimo catheter, f/u recs for discharge   ·	Creatinine overall improved, continue to monitor closely.    Venous Thromboembolism  ·	Likely secondary to metastatic malignancy.  ·	Continue heparin drip at this time. No objection to DOAC on discharge. Pt with previously treated brain metastases 12/2022, last MRI brain 09/27/2023 with stable lesions.     Anemia  Thrombocytopenia  ·	Worsened during hospitalization; likely reactive to inflammation. Now improving.  ·	Monitor and maintain HGB > 7, PLT > 10  ·	Low threshold for workup if there is continued decrease    Will continue to follow.    Michelle Chavez PA-C  Hematology/Oncology  [facility]  [phone] (office)  [phone] (alt office)  Evenings and weekends please call MD on call or office  
Echo:  11/13/23   1. Left ventricular cavity is normal. Left ventricular wall thickness is normal. The interventricular septum is flattened in systole consistent with right ventricular pressure overload. Left ventricular systolic function is mildly decreased with an ejection fraction visually estimated at 45 to 50 %. Global left ventricular hypokinesis.   2. Severely enlarged right ventricular cavity size and severely reduced systolic function.   3. Structurally normal mitral valve with normal leaflet excursion. There is calcification of the mitral valve annulus. There is trace mitral regurgitation.   4. The aortic valve appears trileaflet with normal systolic excursion. There is calcification of the aortic valve leaflets. There is mild aortic regurgitation.   5. The right atrium is moderately dilated in size with an indexed volume of 35.69 ml/m² and an indexed area of 10.45 cm²/m².   6. Estimated pulmonary artery systolic pressure is 53 mmHg, consistent with moderate pulmonary hypertension.   7. Moderate to large pericardial effusion, measuring ~ 1.9 cm adjacent to the distal right ventricular free wall (best seen in the apical view). No echocardiographic evidence of cardiac tamponade.   8. No prior echocardiogram is available for comparison.    CTPA 11/12/23  Pulmonary arterial emboli within the distal aspects of the bilateral central pulmonary arteries extending into the bilateral upper lobe and the left lower lobe lobar, segmental and subsegmental airways with involvement of the interlobar pulmonary arteries. Pulmonary embolus extends into the right middle lobar pulmonary artery. Evidence of right heart strain with RV/LV ratio of 2.0 with associated flattening of the interventricular septum.    Interpretation of Telemetry: sinus 90s    64M PMH testicular cancer s/p orchectomy, lung adenocarcinoma with brain mets on lorlatinib, BPH, HLD admitted with SOB and abdominal distension found to have bilateral hydroureteronephrosis now s/p Christopher catheterization. Patient and wife reporting episodes of presyncope vs syncopal events. Found on admission to have extensive bilateral PEs in setting of hypoxic episode. Reduced RV function noted on TTE and CTPA. Discussed with Dr. Vergara re: risks/benefits of catheter-based intervention. Would not consider full or partially-dosed tPA in setting of brain metastases. For now recommend conservative management, transition to lovenox for outpatient anticoagulation.       Recommendations:   - transition to lovenox  - cont telemetry monitoring  - repeat TTE in 1 month for incidental pericardial effusion      All recommendations pending attending attestation. We will continue to follow with you.     Marissa Stoner MD  PGY-4, Cardiology  Available on TEAMS    For all new consults  [email or web address]  Login: [email or web address]  
Echo:  11/13/23   1. Left ventricular cavity is normal. Left ventricular wall thickness is normal. The interventricular septum is flattened in systole consistent with right ventricular pressure overload. Left ventricular systolic function is mildly decreased with an ejection fraction visually estimated at 45 to 50 %. Global left ventricular hypokinesis.   2. Severely enlarged right ventricular cavity size and severely reduced systolic function.   3. Structurally normal mitral valve with normal leaflet excursion. There is calcification of the mitral valve annulus. There is trace mitral regurgitation.   4. The aortic valve appears trileaflet with normal systolic excursion. There is calcification of the aortic valve leaflets. There is mild aortic regurgitation.   5. The right atrium is moderately dilated in size with an indexed volume of 35.69 ml/m² and an indexed area of 10.45 cm²/m².   6. Estimated pulmonary artery systolic pressure is 53 mmHg, consistent with moderate pulmonary hypertension.   7. Moderate to large pericardial effusion, measuring ~ 1.9 cm adjacent to the distal right ventricular free wall (best seen in the apical view). No echocardiographic evidence of cardiac tamponade.   8. No prior echocardiogram is available for comparison.    CTPA 11/12/23  Pulmonary arterial emboli within the distal aspects of the bilateral central pulmonary arteries extending into the bilateral upper lobe and the left lower lobe lobar, segmental and subsegmental airways with involvement of the interlobar pulmonary arteries. Pulmonary embolus extends into the right middle lobar pulmonary artery. Evidence of right heart strain with RV/LV ratio of 2.0 with associated flattening of the interventricular septum.    Interpretation of Telemetry: sinus 90s    64M PMH testicular cancer s/p orchectomy, lung adenocarcinoma with brain mets on lorlatinib, BPH, HLD admitted with SOB and abdominal distension found to have bilateral hydroureteronephrosis now s/p Christopher catheterization. Patient and wife reporting episodes of presyncope vs syncopal events. Found on admission to have extensive bilateral PEs in setting of hypoxic episode. Reduced RV function noted on TTE and CTPA. Discussed with Dr. Vergara re: risks/benefits of catheter-based intervention. Would not consider full or partially-dosed tPA in setting of brain metastases. For now recommend conservative management with heparin gtt. If remains HD stable over next 48 hours will transition to lovenox.       Recommendations:   - continue heparin gtt  - cont telemetry monitoring  - if remains HD stablecan transition to lovenox tomorrow morning for anticoagulation in setting of malignancy  - repeat TTE in 1 month for incidental pericardial effusion      All recommendations pending attending attestation. We will continue to follow with you.     Marissa Stoner MD  PGY-4, Cardiology  Available on TEAMS    For all new consults  [email or web address]  Login: cardfellows  
64 y.o male with hx of testicular cancer s/p orchectomy, lung adenocarcinoma with brain mets on lorlatinib, BPH, HLD admitted with KAMI 2/2 bilateral hydroureteronephrosis. Course c/b hypoxia/tachycardia found to have extensive bilateral PE. 

## 2023-11-16 NOTE — PROGRESS NOTE ADULT - PROBLEM SELECTOR PLAN 6
DVT ppx: on heparin gtt transition to eliquis  Dispo: dc home    dc today, dc planning time spent in coordination 40mts ( preparing dc summary and plan)    Plan discussed with ACP

## 2023-11-16 NOTE — PROGRESS NOTE ADULT - PROBLEM SELECTOR PLAN 2
Moderate bilateral hydroureteronephrosis, distended urinary bladder and enlarged prostate gland.  -s/p Christopher catheter placement by Urology  -c/w home tamsulosin  -add proscar per Urology  - Maintain strict intake and output   - Patient was started on Cipro by outpatient oncologist for concern for UTI, will continue (dose reduced due to KAMI)  - Keep Christopher for at least 5 days per Urology  -Patient will follow-up with established outpatient Urologist, chelsea an appointment next Monday 11/13
creat 2.84 from baseline 1.0, creatinine stable at 1.5 today  - CT abdomen with bilateral hydronephrosis  - Postrenal KAMI s/p Jeronimo catheter placed by Urology with 1L of urine output  - Monitor BMP  -Pt failed tov, jeronimo replaced, pt to be dc on jeronimo, advised to f/u with urology as outpt
creat 2.84 from baseline 1.0, now improving  - CT abdomen with bilateral hydronephrosis  - Postrenal KAMI s/p Christopher catheter placed by Urology with 1L of urine output  - Monitor BMP  -TOV tonight if ok with urology
creat 2.84 from baseline 1.0, now improving  - CT abdomen with bilateral hydronephrosis  - Postrenal KAMI s/p Jeronimo catheter placed by Urology with 1L of urine output  - Monitor BMP  -Pt is retaining urine post jeronimo removal, may need to replace jeronimo  - pt feels anxious asking for xanax discussed with Cards not favoring xanax, will monitor if worsens rec atarax low dose
creat 2.84 from baseline 1.0, improving to 1.5 after jeronimo placement   - CT abdomen with bilateral hydronephrosis  - Postrenal KAMI s/p Jeronimo catheter placed by Urology with 1L of urine output  - Monitor BMP
creat 2.84 from baseline 1.0, improving to 1.5 after jeronimo placement now elevated to 1.9 today  - CT abdomen with bilateral hydronephrosis  - Postrenal KAMI s/p Jeronimo catheter placed by Urology with 1L of urine output  - Monitor BMP

## 2023-11-16 NOTE — PROGRESS NOTE ADULT - PROBLEM SELECTOR PROBLEM 4
Adenocarcinoma of lung

## 2023-11-16 NOTE — PROGRESS NOTE ADULT - PROBLEM SELECTOR PROBLEM 1
Acute pulmonary embolism with acute cor pulmonale
KAMI (acute kidney injury)
Acute pulmonary embolism with acute cor pulmonale
Acute pulmonary embolism with acute cor pulmonale

## 2023-11-16 NOTE — PROGRESS NOTE ADULT - PROBLEM SELECTOR PROBLEM 2
KAMI (acute kidney injury)
Obstructive uropathy
KAMI (acute kidney injury)

## 2023-11-16 NOTE — PROGRESS NOTE ADULT - REASON FOR ADMISSION
KAMI
Orders are in
Patient has been scheduled on 4/24/18 to arrive at 8:00am. Instructions have been emailed to them.    
KAMI

## 2023-11-16 NOTE — PROGRESS NOTE ADULT - SUBJECTIVE AND OBJECTIVE BOX
Overnight Events: NAEO    Review Of Systems: No chest pain, shortness of breath, or palpitations            Current Meds:  acetaminophen     Tablet .. 650 milliGRAM(s) Oral every 6 hours PRN  aluminum hydroxide/magnesium hydroxide/simethicone Suspension 30 milliLiter(s) Oral every 4 hours PRN  atorvastatin 40 milliGRAM(s) Oral at bedtime  bisacodyl 5 milliGRAM(s) Oral every 12 hours PRN  ciprofloxacin     Tablet 250 milliGRAM(s) Oral two times a day  finasteride 5 milliGRAM(s) Oral daily  heparin   Injectable 5500 Unit(s) IV Push every 6 hours PRN  heparin   Injectable 2500 Unit(s) IV Push every 6 hours PRN  heparin  Infusion.  Unit(s)/Hr IV Continuous <Continuous>  melatonin 3 milliGRAM(s) Oral at bedtime PRN  ondansetron Injectable 4 milliGRAM(s) IV Push every 8 hours PRN  polyethylene glycol 3350 17 Gram(s) Oral daily  senna 2 Tablet(s) Oral at bedtime  tamsulosin 0.4 milliGRAM(s) Oral at bedtime      Vitals:  T(F): 97.9 (11-14), Max: 98.2 (11-13)  HR: 90 (11-14) (90 - 98)  BP: 125/75 (11-14) (99/63 - 125/75)  RR: 16 (11-14)  SpO2: 94% (11-14)  I&O's Summary    13 Nov 2023 07:01  -  14 Nov 2023 07:00  --------------------------------------------------------  IN: 0 mL / OUT: 900 mL / NET: -900 mL        Physical Exam:  GEN: comfortable appearing, lying in bed in NAD  HEENT: NCAT, MMM  CV: Regular S1, S2, no m/r/g  RESP: CTAB  ABD: Soft, NTND, +BS  EXT: No LE edema, WWP, pulses palpable throughout  NEURO: No focal deficits, AOx3  SKIN:  No rashes                          11.1   6.23  )-----------( 112      ( 14 Nov 2023 07:45 )             35.3     11-14    140  |  106  |  20  ----------------------------<  96  3.9   |  24  |  1.48<H>    Ca    8.9      14 Nov 2023 07:45  Phos  2.8     11-14  Mg     1.80     11-14      PTT - ( 14 Nov 2023 08:00 )  PTT:69.5 sec  CARDIAC MARKERS ( 12 Nov 2023 05:16 )  40 ng/L / x     / x     / 59 U/L / x     / 1.7 ng/mL  CARDIAC MARKERS ( 10 Nov 2023 03:45 )  71 ng/L / x     / x     / x     / x     / x      CARDIAC MARKERS ( 10 Nov 2023 02:00 )  74 ng/L / x     / x     / x     / x     / x            
  Overnight Events: NAEO    Review Of Systems: No chest pain, improving shortness of breath      Current Meds:  acetaminophen     Tablet .. 650 milliGRAM(s) Oral every 6 hours PRN  aluminum hydroxide/magnesium hydroxide/simethicone Suspension 30 milliLiter(s) Oral every 4 hours PRN  atorvastatin 40 milliGRAM(s) Oral at bedtime  ciprofloxacin     Tablet 250 milliGRAM(s) Oral two times a day  finasteride 5 milliGRAM(s) Oral daily  heparin   Injectable 5500 Unit(s) IV Push every 6 hours PRN  heparin   Injectable 2500 Unit(s) IV Push every 6 hours PRN  heparin  Infusion.  Unit(s)/Hr IV Continuous <Continuous>  melatonin 3 milliGRAM(s) Oral at bedtime PRN  ondansetron Injectable 4 milliGRAM(s) IV Push every 8 hours PRN  sodium chloride 0.9%. 1000 milliLiter(s) IV Continuous <Continuous>  tamsulosin 0.4 milliGRAM(s) Oral at bedtime      Vitals:  T(F): 97.4 (11-13), Max: 99.2 (11-12)  HR: 101 (11-13) (94 - 121)  BP: 94/60 (11-13) (94/60 - 118/71)  RR: 18 (11-13)  SpO2: 99% (11-13)  I&O's Summary    12 Nov 2023 07:01  -  13 Nov 2023 07:00  --------------------------------------------------------  IN: 0 mL / OUT: 1200 mL / NET: -1200 mL        Physical Exam:  GEN: comfortable appearing, lying in bed in NAD  HEENT: NCAT, MMM  CV: Regular S1, S2, no m/r/g  RESP: CTAB  ABD: Soft, NTND, +BS  EXT: No LE edema, WWP, pulses palpable throughout  NEURO: No focal deficits, AOx3  SKIN:  No rashes                          12.2   8.84  )-----------( 112      ( 13 Nov 2023 02:11 )             37.7     11-13    139  |  100  |  23  ----------------------------<  109<H>  3.9   |  27  |  1.92<H>    Ca    8.9      13 Nov 2023 02:11  Phos  3.2     11-13  Mg     2.00     11-13      PT/INR - ( 12 Nov 2023 09:44 )   PT: 12.6 sec;   INR: 1.13 ratio         PTT - ( 13 Nov 2023 10:49 )  PTT:51.2 sec  CARDIAC MARKERS ( 12 Nov 2023 05:16 )  40 ng/L / x     / x     / 59 U/L / x     / 1.7 ng/mL  CARDIAC MARKERS ( 10 Nov 2023 03:45 )  71 ng/L / x     / x     / x     / x     / x      CARDIAC MARKERS ( 10 Nov 2023 02:00 )  74 ng/L / x     / x     / x     / x     / x                    
CHIEF COMPLAINT  KAMI    HISTORY OF PRESENT ILLNESS  CORBY CRUZ is a 63y Male who presents with a chief complaint of KAMI    No acute events. No complaints.    REVIEW OF SYSTEMS  A complete review of systems was performed; negative except per HPI    PHYSICAL EXAM  T(C): 36.3 (11-13-23 @ 10:00), Max: 37.3 (11-12-23 @ 21:40)  HR: 101 (11-13-23 @ 10:00) (94 - 121)  BP: 94/60 (11-13-23 @ 10:00) (94/60 - 118/71)  RR: 18 (11-13-23 @ 10:00) (16 - 18)  SpO2: 99% (11-13-23 @ 10:00) (93% - 99%)  Constitutional: alert, awake, in no acute distress  Eyes: PERRL, EOMI  HEENT: normocephalic, atraumatic  Neck: supple, non-tender  Cardiovascular: normal perfusion, tachycardic  Respiratory: normal respiratory efforts; no increased use of accessory muscles  Gastrointestinal: soft, non-tender  Musculoskeletal: normal range of motion, no deformities noted  Neurological: alert, CN II to XI grossly intact  Skin: warm, dry    LABORATORY DATA                        12.2   8.84  )-----------( 112      ( 13 Nov 2023 02:11 )             37.7     11-13    139  |  100  |  23  ----------------------------<  109<H>  3.9   |  27  |  1.92<H>    Ca    8.9      13 Nov 2023 02:11  Phos  3.2     11-13  Mg     2.00     11-13    
  Overnight Events: NAEO    Review Of Systems: No chest pain, shortness of breath, or palpitations            Current Meds:  acetaminophen     Tablet .. 650 milliGRAM(s) Oral every 6 hours PRN  aluminum hydroxide/magnesium hydroxide/simethicone Suspension 30 milliLiter(s) Oral every 4 hours PRN  atorvastatin 40 milliGRAM(s) Oral at bedtime  bisacodyl 5 milliGRAM(s) Oral every 12 hours PRN  ciprofloxacin     Tablet 250 milliGRAM(s) Oral two times a day  finasteride 5 milliGRAM(s) Oral daily  heparin   Injectable 5500 Unit(s) IV Push every 6 hours PRN  heparin   Injectable 2500 Unit(s) IV Push every 6 hours PRN  heparin  Infusion.  Unit(s)/Hr IV Continuous <Continuous>  melatonin 3 milliGRAM(s) Oral at bedtime PRN  ondansetron Injectable 4 milliGRAM(s) IV Push every 8 hours PRN  polyethylene glycol 3350 17 Gram(s) Oral daily  senna 2 Tablet(s) Oral at bedtime  tamsulosin 0.4 milliGRAM(s) Oral at bedtime      Vitals:  T(F): 97.9 (11-15), Max: 98.3 (11-14)  HR: 80 (11-15) (80 - 101)  BP: 119/82 (11-15) (111/78 - 119/82)  RR: 16 (11-15)  SpO2: 95% (11-15)  I&O's Summary      Physical Exam:  GEN: comfortable appearing, lying in bed in NAD  HEENT: NCAT, MMM  CV: Regular S1, S2, no m/r/g  RESP: CTAB  ABD: Soft, NTND, +BS  EXT: No LE edema, WWP, pulses palpable throughout  NEURO: No focal deficits, AOx3  SKIN:  No rashes                          13.0   6.94  )-----------( 137      ( 15 Nov 2023 05:21 )             41.2     11-15    141  |  102  |  21  ----------------------------<  99  3.4<L>   |  28  |  1.53<H>    Ca    9.5      15 Nov 2023 05:21  Phos  2.5     11-15  Mg     2.00     11-15      PTT - ( 15 Nov 2023 05:21 )  PTT:74.9 sec  CARDIAC MARKERS ( 12 Nov 2023 05:16 )  40 ng/L / x     / x     / 59 U/L / x     / 1.7 ng/mL  CARDIAC MARKERS ( 10 Nov 2023 03:45 )  71 ng/L / x     / x     / x     / x     / x      CARDIAC MARKERS ( 10 Nov 2023 02:00 )  74 ng/L / x     / x     / x     / x     / x              
Patient is a 63y old  Male who presents with a chief complaint of KAMI (15 Nov 2023 10:01)    Patient seen this afternoon. He feels well overall, no acute complaints.    MEDICATIONS  (STANDING):  atorvastatin 40 milliGRAM(s) Oral at bedtime  ciprofloxacin     Tablet 250 milliGRAM(s) Oral two times a day  finasteride 5 milliGRAM(s) Oral daily  heparin  Infusion.  Unit(s)/Hr (12 mL/Hr) IV Continuous <Continuous>  polyethylene glycol 3350 17 Gram(s) Oral daily  senna 2 Tablet(s) Oral at bedtime  tamsulosin 0.4 milliGRAM(s) Oral at bedtime    MEDICATIONS  (PRN):  acetaminophen     Tablet .. 650 milliGRAM(s) Oral every 6 hours PRN Temp greater or equal to 38C (100.4F), Mild Pain (1 - 3), Moderate Pain (4 - 6)  aluminum hydroxide/magnesium hydroxide/simethicone Suspension 30 milliLiter(s) Oral every 4 hours PRN Dyspepsia  bisacodyl 5 milliGRAM(s) Oral every 12 hours PRN Constipation  heparin   Injectable 5500 Unit(s) IV Push every 6 hours PRN For aPTT less than 40  heparin   Injectable 2500 Unit(s) IV Push every 6 hours PRN For aPTT between 40 - 57  melatonin 3 milliGRAM(s) Oral at bedtime PRN Insomnia  ondansetron Injectable 4 milliGRAM(s) IV Push every 8 hours PRN Nausea and/or Vomiting        Vital Signs Last 24 Hrs  T(C): 36.6 (15 Nov 2023 05:00), Max: 36.8 (14 Nov 2023 20:30)  T(F): 97.9 (15 Nov 2023 05:00), Max: 98.2 (14 Nov 2023 20:30)  HR: 80 (15 Nov 2023 05:00) (80 - 101)  BP: 119/82 (15 Nov 2023 05:00) (115/72 - 119/82)  BP(mean): --  RR: 16 (15 Nov 2023 05:00) (16 - 17)  SpO2: 95% (15 Nov 2023 05:00) (94% - 95%)    Parameters below as of 15 Nov 2023 05:00  Patient On (Oxygen Delivery Method): room air        PE  NAD  Awake, alert  Anicteric, MMM  No c/c/e  No rash grossly  FROM                          13.0   6.94  )-----------( 137      ( 15 Nov 2023 05:21 )             41.2       11-15    141  |  102  |  21  ----------------------------<  99  3.4<L>   |  28  |  1.53<H>    Ca    9.5      15 Nov 2023 05:21  Phos  2.5     11-15  Mg     2.00     11-15        
Patient is a 63y old  Male who presents with a chief complaint of KAMI (15 Nov 2023 14:18)      SUBJECTIVE / OVERNIGHT EVENTS: Pt seen and examined at 12N, no overnight events, pt denies any sob, chest pain, bleeding or any other complaints, wife at bedside. Eager to go home. No other new issues reported.      MEDICATIONS  (STANDING):  apixaban 10 milliGRAM(s) Oral every 12 hours  atorvastatin 40 milliGRAM(s) Oral at bedtime  finasteride 5 milliGRAM(s) Oral daily  polyethylene glycol 3350 17 Gram(s) Oral daily  senna 2 Tablet(s) Oral at bedtime  tamsulosin 0.4 milliGRAM(s) Oral at bedtime    MEDICATIONS  (PRN):  acetaminophen     Tablet .. 650 milliGRAM(s) Oral every 6 hours PRN Temp greater or equal to 38C (100.4F), Mild Pain (1 - 3), Moderate Pain (4 - 6)  aluminum hydroxide/magnesium hydroxide/simethicone Suspension 30 milliLiter(s) Oral every 4 hours PRN Dyspepsia  bisacodyl 5 milliGRAM(s) Oral every 12 hours PRN Constipation  melatonin 3 milliGRAM(s) Oral at bedtime PRN Insomnia  ondansetron Injectable 4 milliGRAM(s) IV Push every 8 hours PRN Nausea and/or Vomiting      Vital Signs Last 24 Hrs  T(C): 37 (16 Nov 2023 11:52), Max: 37 (16 Nov 2023 11:52)  T(F): 98.6 (16 Nov 2023 11:52), Max: 98.6 (16 Nov 2023 11:52)  HR: 81 (16 Nov 2023 11:52) (76 - 86)  BP: 116/75 (16 Nov 2023 11:52) (110/67 - 117/69)  BP(mean): --  RR: 17 (16 Nov 2023 11:52) (16 - 18)  SpO2: 100% (16 Nov 2023 11:52) (97% - 100%)    Parameters below as of 16 Nov 2023 05:55  Patient On (Oxygen Delivery Method): room air      CAPILLARY BLOOD GLUCOSE        I&O's Summary    15 Nov 2023 07:01  -  16 Nov 2023 07:00  --------------------------------------------------------  IN: 436 mL / OUT: 1700 mL / NET: -1264 mL        PHYSICAL EXAM:  GENERAL: NAD  CHEST/LUNG: Clear to auscultation bilaterally; No wheeze  HEART: Regular rate and rhythm  ABDOMEN: Soft, Nontender, Nondistended  EXTREMITIES: no LE edema  :+ jeronimo with clear urine in the bag  PSYCH: Calm  NEUROLOGY: AAOx3  SKIN: No rashes or lesions    LABS:                        11.4   6.52  )-----------( 129      ( 16 Nov 2023 05:38 )             35.4     11-16    141  |  102  |  18  ----------------------------<  97  4.0   |  28  |  1.58<H>    Ca    9.5      16 Nov 2023 05:38  Phos  2.6     11-16  Mg     1.90     11-16      PTT - ( 16 Nov 2023 05:38 )  PTT:75.9 sec      Urinalysis Basic - ( 16 Nov 2023 05:38 )    Color: x / Appearance: x / SG: x / pH: x  Gluc: 97 mg/dL / Ketone: x  / Bili: x / Urobili: x   Blood: x / Protein: x / Nitrite: x   Leuk Esterase: x / RBC: x / WBC x   Sq Epi: x / Non Sq Epi: x / Bacteria: x        RADIOLOGY & ADDITIONAL TESTS:    Imaging Personally Reviewed:    Consultant(s) Notes Reviewed:      Care Discussed with Consultants/Other Providers:  
Patient is a 63y old  Male who presents with a chief complaint of KAMI (14 Nov 2023 10:46)      SUBJECTIVE / OVERNIGHT EVENTS: Pt seen and examined at 11:50am, no overnight events, pt denies any sob, chest pain, bleeding or any other complaints. Is ambulating in the hallway. No other new issues reported.      MEDICATIONS  (STANDING):  atorvastatin 40 milliGRAM(s) Oral at bedtime  ciprofloxacin     Tablet 250 milliGRAM(s) Oral two times a day  finasteride 5 milliGRAM(s) Oral daily  heparin  Infusion.  Unit(s)/Hr (12 mL/Hr) IV Continuous <Continuous>  polyethylene glycol 3350 17 Gram(s) Oral daily  senna 2 Tablet(s) Oral at bedtime  tamsulosin 0.4 milliGRAM(s) Oral at bedtime    MEDICATIONS  (PRN):  acetaminophen     Tablet .. 650 milliGRAM(s) Oral every 6 hours PRN Temp greater or equal to 38C (100.4F), Mild Pain (1 - 3), Moderate Pain (4 - 6)  aluminum hydroxide/magnesium hydroxide/simethicone Suspension 30 milliLiter(s) Oral every 4 hours PRN Dyspepsia  bisacodyl 5 milliGRAM(s) Oral every 12 hours PRN Constipation  heparin   Injectable 5500 Unit(s) IV Push every 6 hours PRN For aPTT less than 40  heparin   Injectable 2500 Unit(s) IV Push every 6 hours PRN For aPTT between 40 - 57  melatonin 3 milliGRAM(s) Oral at bedtime PRN Insomnia  ondansetron Injectable 4 milliGRAM(s) IV Push every 8 hours PRN Nausea and/or Vomiting      Vital Signs Last 24 Hrs  T(C): 36.8 (14 Nov 2023 13:37), Max: 36.8 (13 Nov 2023 21:47)  T(F): 98.3 (14 Nov 2023 13:37), Max: 98.3 (14 Nov 2023 13:37)  HR: 95 (14 Nov 2023 13:37) (90 - 98)  BP: 111/78 (14 Nov 2023 13:37) (99/63 - 125/75)  BP(mean): --  RR: 16 (14 Nov 2023 13:37) (16 - 18)  SpO2: 100% (14 Nov 2023 13:37) (94% - 100%)    Parameters below as of 14 Nov 2023 05:00  Patient On (Oxygen Delivery Method): nasal cannula  O2 Flow (L/min): 1    CAPILLARY BLOOD GLUCOSE        I&O's Summary    13 Nov 2023 07:01  -  14 Nov 2023 07:00  --------------------------------------------------------  IN: 0 mL / OUT: 900 mL / NET: -900 mL        PHYSICAL EXAM:  GENERAL: NAD  CHEST/LUNG: Clear to auscultation bilaterally; No wheeze  HEART: Regular rate and rhythm  ABDOMEN: Soft, Nontender, Nondistended  EXTREMITIES: no LE edema  : + jeronimo with no hematuria  PSYCH: Calm  NEUROLOGY: AAOx3  SKIN: No rashes or lesions      LABS:                        11.1   6.23  )-----------( 112      ( 14 Nov 2023 07:45 )             35.3     11-14    140  |  106  |  20  ----------------------------<  96  3.9   |  24  |  1.48<H>    Ca    8.9      14 Nov 2023 07:45  Phos  2.8     11-14  Mg     1.80     11-14      PTT - ( 14 Nov 2023 08:00 )  PTT:69.5 sec      Urinalysis Basic - ( 14 Nov 2023 07:45 )    Color: x / Appearance: x / SG: x / pH: x  Gluc: 96 mg/dL / Ketone: x  / Bili: x / Urobili: x   Blood: x / Protein: x / Nitrite: x   Leuk Esterase: x / RBC: x / WBC x   Sq Epi: x / Non Sq Epi: x / Bacteria: x        RADIOLOGY & ADDITIONAL TESTS:    Imaging Personally Reviewed:    Consultant(s) Notes Reviewed:      Care Discussed with Consultants/Other Providers:  
Patient is a 63y old  Male who presents with a chief complaint of KAMI (15 Nov 2023 13:53)      SUBJECTIVE / OVERNIGHT EVENTS: Pt seen and examined at 11:50am, no overnight events, pt is retaining urine post jeronimo removal, may have to replace jeronimo, pt feels anxious, asking for xanax, denies any sob, chest pain, bleeding or any other complaints, wife at bedside. No other new issues reported.      MEDICATIONS  (STANDING):  atorvastatin 40 milliGRAM(s) Oral at bedtime  ciprofloxacin     Tablet 250 milliGRAM(s) Oral two times a day  finasteride 5 milliGRAM(s) Oral daily  heparin  Infusion.  Unit(s)/Hr (12 mL/Hr) IV Continuous <Continuous>  polyethylene glycol 3350 17 Gram(s) Oral daily  senna 2 Tablet(s) Oral at bedtime  tamsulosin 0.4 milliGRAM(s) Oral at bedtime    MEDICATIONS  (PRN):  acetaminophen     Tablet .. 650 milliGRAM(s) Oral every 6 hours PRN Temp greater or equal to 38C (100.4F), Mild Pain (1 - 3), Moderate Pain (4 - 6)  aluminum hydroxide/magnesium hydroxide/simethicone Suspension 30 milliLiter(s) Oral every 4 hours PRN Dyspepsia  bisacodyl 5 milliGRAM(s) Oral every 12 hours PRN Constipation  heparin   Injectable 5500 Unit(s) IV Push every 6 hours PRN For aPTT less than 40  heparin   Injectable 2500 Unit(s) IV Push every 6 hours PRN For aPTT between 40 - 57  melatonin 3 milliGRAM(s) Oral at bedtime PRN Insomnia  ondansetron Injectable 4 milliGRAM(s) IV Push every 8 hours PRN Nausea and/or Vomiting      Vital Signs Last 24 Hrs  T(C): 36.8 (15 Nov 2023 13:55), Max: 36.8 (14 Nov 2023 20:30)  T(F): 98.3 (15 Nov 2023 13:55), Max: 98.3 (15 Nov 2023 13:55)  HR: 85 (15 Nov 2023 13:55) (80 - 101)  BP: 124/85 (15 Nov 2023 13:55) (115/72 - 124/85)  BP(mean): --  RR: 16 (15 Nov 2023 13:55) (16 - 17)  SpO2: 99% (15 Nov 2023 13:55) (94% - 99%)    Parameters below as of 15 Nov 2023 13:55  Patient On (Oxygen Delivery Method): room air      CAPILLARY BLOOD GLUCOSE        I&O's Summary      PHYSICAL EXAM:  GENERAL: NAD  CHEST/LUNG: Clear to auscultation bilaterally; No wheeze  HEART: Regular rate and rhythm  ABDOMEN: Soft, Nontender, Nondistended  EXTREMITIES: no LE edema  PSYCH: Calm  NEUROLOGY: AAOx3  SKIN: No rashes or lesions      LABS:                        13.0   6.94  )-----------( 137      ( 15 Nov 2023 05:21 )             41.2     11-15    141  |  102  |  21  ----------------------------<  99  3.4<L>   |  28  |  1.53<H>    Ca    9.5      15 Nov 2023 05:21  Phos  2.5     11-15  Mg     2.00     11-15      PTT - ( 15 Nov 2023 05:21 )  PTT:74.9 sec      Urinalysis Basic - ( 15 Nov 2023 05:21 )    Color: x / Appearance: x / SG: x / pH: x  Gluc: 99 mg/dL / Ketone: x  / Bili: x / Urobili: x   Blood: x / Protein: x / Nitrite: x   Leuk Esterase: x / RBC: x / WBC x   Sq Epi: x / Non Sq Epi: x / Bacteria: x        RADIOLOGY & ADDITIONAL TESTS:    Imaging Personally Reviewed:    Consultant(s) Notes Reviewed:      Care Discussed with Consultants/Other Providers:  
Patient is a 63y old  Male who presents with a chief complaint of KAMI (13 Nov 2023 14:30)      SUBJECTIVE / OVERNIGHT EVENTS: Pt seen and examined at 12N, no overnight events, pt denies any sob, chest pain, bleeding or any other complaints, family at bedside. No other new issues reported.    MEDICATIONS  (STANDING):  atorvastatin 40 milliGRAM(s) Oral at bedtime  ciprofloxacin     Tablet 250 milliGRAM(s) Oral two times a day  finasteride 5 milliGRAM(s) Oral daily  heparin  Infusion.  Unit(s)/Hr (12 mL/Hr) IV Continuous <Continuous>  sodium chloride 0.9%. 1000 milliLiter(s) (75 mL/Hr) IV Continuous <Continuous>  tamsulosin 0.4 milliGRAM(s) Oral at bedtime    MEDICATIONS  (PRN):  acetaminophen     Tablet .. 650 milliGRAM(s) Oral every 6 hours PRN Temp greater or equal to 38C (100.4F), Mild Pain (1 - 3), Moderate Pain (4 - 6)  aluminum hydroxide/magnesium hydroxide/simethicone Suspension 30 milliLiter(s) Oral every 4 hours PRN Dyspepsia  heparin   Injectable 5500 Unit(s) IV Push every 6 hours PRN For aPTT less than 40  heparin   Injectable 2500 Unit(s) IV Push every 6 hours PRN For aPTT between 40 - 57  melatonin 3 milliGRAM(s) Oral at bedtime PRN Insomnia  ondansetron Injectable 4 milliGRAM(s) IV Push every 8 hours PRN Nausea and/or Vomiting      Vital Signs Last 24 Hrs  T(C): 36.3 (13 Nov 2023 10:00), Max: 37.3 (12 Nov 2023 21:40)  T(F): 97.4 (13 Nov 2023 10:00), Max: 99.2 (12 Nov 2023 21:40)  HR: 101 (13 Nov 2023 10:00) (94 - 121)  BP: 94/60 (13 Nov 2023 10:00) (94/60 - 118/71)  BP(mean): --  RR: 18 (13 Nov 2023 10:00) (16 - 18)  SpO2: 99% (13 Nov 2023 10:00) (93% - 99%)    Parameters below as of 13 Nov 2023 10:00  Patient On (Oxygen Delivery Method): nasal cannula  O2 Flow (L/min): 2    CAPILLARY BLOOD GLUCOSE        I&O's Summary    12 Nov 2023 07:01  -  13 Nov 2023 07:00  --------------------------------------------------------  IN: 0 mL / OUT: 1200 mL / NET: -1200 mL        PHYSICAL EXAM:  GENERAL: NAD  CHEST/LUNG: Clear to auscultation bilaterally; No wheeze  HEART: Regular rate and rhythm  ABDOMEN: Soft, Nontender, Nondistended  EXTREMITIES: no LE edema  : + jeronimo  PSYCH: Calm  NEUROLOGY: AAOx3  SKIN: No rashes or lesions    LABS:                        12.2   8.84  )-----------( 112      ( 13 Nov 2023 02:11 )             37.7     11-13    139  |  100  |  23  ----------------------------<  109<H>  3.9   |  27  |  1.92<H>    Ca    8.9      13 Nov 2023 02:11  Phos  3.2     11-13  Mg     2.00     11-13      PT/INR - ( 12 Nov 2023 09:44 )   PT: 12.6 sec;   INR: 1.13 ratio         PTT - ( 13 Nov 2023 10:49 )  PTT:51.2 sec  CARDIAC MARKERS ( 12 Nov 2023 05:16 )  x     / x     / 59 U/L / x     / 1.7 ng/mL      Urinalysis Basic - ( 13 Nov 2023 02:11 )    Color: x / Appearance: x / SG: x / pH: x  Gluc: 109 mg/dL / Ketone: x  / Bili: x / Urobili: x   Blood: x / Protein: x / Nitrite: x   Leuk Esterase: x / RBC: x / WBC x   Sq Epi: x / Non Sq Epi: x / Bacteria: x        RADIOLOGY & ADDITIONAL TESTS:  < from: TTE W or WO Ultrasound Enhancing Agent (11.13.23 @ 09:20) >  . Left ventricular cavity is normal. Left ventricular wall thickness is normal. The interventricular septum is flattened in systole consistent with right ventricular pressure overload. Left ventricular systolic function is mildly decreased with an ejection fraction visually estimated at 45 to 50 %. Global left ventricular hypokinesis.   2. Severely enlarged right ventricular cavity size and severely reduced systolic function.   3. Structurally normal mitral valve with normal leaflet excursion. There is calcification of the mitral valve annulus. There is trace mitral regurgitation.   4. The aortic valve appears trileaflet with normal systolic excursion. There is calcification of the aortic valve leaflets. There is mild aortic regurgitation.   5. The right atrium is moderately dilated in size with an indexed volume of 35.69 ml/m² and an indexed area of 10.45 cm²/m².   6. Estimated pulmonary artery systolic pressure is 53 mmHg, consistent with moderate pulmonary hypertension.   7. Moderate to large pericardial effusion, measuring ~ 1.9 cm adjacent to the distal right ventricular free wall (best seen in the apical view). No echocardiographic evidence of cardiac tamponade.   8. No prior echocardiogram is available for comparison.      < end of copied text >  < from: US Duplex Venous Lower Ext Complete, Bilateral (11.13.23 @ 09:20) >  US DPLX LWR EXT VEINS COMP    < end of copied text >  < from: US Duplex Venous Lower Ext Complete, Bilateral (11.13.23 @ 09:20) >  No evidence of deep venous thrombosis in either lower extremity.      < end of copied text >      Imaging Personally Reviewed:    Consultant(s) Notes Reviewed:      Care Discussed with Consultants/Other Providers:  
[facility] Division of Hospital Medicine  Dunia Dior MD  Available on [employer] TEAMS    SUBJECTIVE / OVERNIGHT EVENTS: Patient seen and examined. Overnight events reviewed. Patient states that he went to the bathroom and was straining to have a BM, after that he became tachycardic and short of breath and his O2 dropped. Confirmed on tele/ O2 in 70s, HR in 130s. He denies cough, states he does feel palpitations       MEDICATIONS  (STANDING):  atorvastatin 40 milliGRAM(s) Oral at bedtime  ciprofloxacin     Tablet 250 milliGRAM(s) Oral two times a day  finasteride 5 milliGRAM(s) Oral daily  heparin  Infusion.  Unit(s)/Hr (12 mL/Hr) IV Continuous <Continuous>  sodium chloride 0.9%. 1000 milliLiter(s) (75 mL/Hr) IV Continuous <Continuous>  tamsulosin 0.4 milliGRAM(s) Oral at bedtime    MEDICATIONS  (PRN):  acetaminophen     Tablet .. 650 milliGRAM(s) Oral every 6 hours PRN Temp greater or equal to 38C (100.4F), Mild Pain (1 - 3), Moderate Pain (4 - 6)  aluminum hydroxide/magnesium hydroxide/simethicone Suspension 30 milliLiter(s) Oral every 4 hours PRN Dyspepsia  heparin   Injectable 5500 Unit(s) IV Push every 6 hours PRN For aPTT less than 40  heparin   Injectable 2500 Unit(s) IV Push every 6 hours PRN For aPTT between 40 - 57  melatonin 3 milliGRAM(s) Oral at bedtime PRN Insomnia  ondansetron Injectable 4 milliGRAM(s) IV Push every 8 hours PRN Nausea and/or Vomiting      I&O's Summary    11 Nov 2023 07:01  -  12 Nov 2023 07:00  --------------------------------------------------------  IN: 550 mL / OUT: 2000 mL / NET: -1450 mL        PHYSICAL EXAM:  Vital Signs Last 24 Hrs  T(C): 37 (12 Nov 2023 08:30), Max: 37.1 (11 Nov 2023 21:07)  T(F): 98.6 (12 Nov 2023 08:30), Max: 98.8 (11 Nov 2023 21:07)  HR: 128 (12 Nov 2023 08:30) (102 - 128)  BP: 116/73 (12 Nov 2023 08:30) (116/73 - 125/81)  BP(mean): --  RR: 18 (12 Nov 2023 08:30) (17 - 18)  SpO2: 94% (12 Nov 2023 08:30) (80% - 96%)    Parameters below as of 12 Nov 2023 08:30  Patient On (Oxygen Delivery Method): nasal cannula  O2 Flow (L/min): 2    CONSTITUTIONAL: NAD, well-developed, well-groomed  EYES: Conjunctiva and sclera clear  ENMT: Moist oral mucosa, no pharyngeal injection or exudates; normal dentition  RESPIRATORY: Normal respiratory effort; lungs are clear to auscultation bilaterally; No wheezes or rales  CARDIOVASCULAR: +tachycardia; No lower extremity edema  ABDOMEN: Soft, Nontender, Nondistended; Bowel sounds present  MUSCULOSKELETAL:  No clubbing or cyanosis of digits; No joint swelling or tenderness to palpation  PSYCH: AAOx3 (oriented to person, place, and time); affect appropriate  NEUROLOGY: No focal deficits    LABS:                        14.3   12.61 )-----------( 165      ( 12 Nov 2023 05:16 )             45.1     11-12    140  |  99  |  21  ----------------------------<  88  4.1   |  27  |  1.58<H>    Ca    9.8      12 Nov 2023 05:16  Phos  3.1     11-12  Mg     2.10     11-12      PT/INR - ( 12 Nov 2023 09:44 )   PT: 12.6 sec;   INR: 1.13 ratio         PTT - ( 12 Nov 2023 09:44 )  PTT:29.9 sec  CARDIAC MARKERS ( 12 Nov 2023 05:16 )  x     / x     / 59 U/L / x     / 1.7 ng/mL      Urinalysis Basic - ( 12 Nov 2023 05:16 )    Color: x / Appearance: x / SG: x / pH: x  Gluc: 88 mg/dL / Ketone: x  / Bili: x / Urobili: x   Blood: x / Protein: x / Nitrite: x   Leuk Esterase: x / RBC: x / WBC x   Sq Epi: x / Non Sq Epi: x / Bacteria: x        Culture - Blood (collected 10 Nov 2023 02:10)  Source: .Blood Blood-Peripheral  Preliminary Report (12 Nov 2023 07:01):    No growth at 48 Hours    Culture - Urine (collected 10 Nov 2023 02:05)  Source: Clean Catch Clean Catch (Midstream)  Final Report (11 Nov 2023 07:54):    No growth    Culture - Blood (collected 10 Nov 2023 01:55)  Source: .Blood Blood-Peripheral  Preliminary Report (12 Nov 2023 07:01):    No growth at 48 Hours      SARS-CoV-2: NotDetec (10 Nov 2023 02:34)      RADIOLOGY & ADDITIONAL TESTS:  New Results Reviewed Today: CTA chest w/ PE     COMMUNICATION:  Care Discussed with Consultants/Other Providers and Details of Discussion: CCU NP, PERT eval   
[facility] Division of Hospital Medicine  Dunia Dior MD  Available on [employer] TEAMS    SUBJECTIVE / OVERNIGHT EVENTS: Patient seen and examined, wife at bedside. Reports that he feels much better after jeronimo placement, no fullness or abdominal pain. States he has urology appt for Monday. Discussed will stop IVF and see how Cr is in AM and if OK, plan for discharge tomorrow. Wife and patient agreeable with plan. Denies SOB or chest pain      MEDICATIONS  (STANDING):  atorvastatin 40 milliGRAM(s) Oral at bedtime  ciprofloxacin     Tablet 250 milliGRAM(s) Oral two times a day  finasteride 5 milliGRAM(s) Oral daily  Lorlatinib 100 milliGRAM(s)   Oral <User Schedule>  tamsulosin 0.4 milliGRAM(s) Oral at bedtime    MEDICATIONS  (PRN):  acetaminophen     Tablet .. 650 milliGRAM(s) Oral every 6 hours PRN Temp greater or equal to 38C (100.4F), Mild Pain (1 - 3), Moderate Pain (4 - 6)  aluminum hydroxide/magnesium hydroxide/simethicone Suspension 30 milliLiter(s) Oral every 4 hours PRN Dyspepsia  melatonin 3 milliGRAM(s) Oral at bedtime PRN Insomnia  ondansetron Injectable 4 milliGRAM(s) IV Push every 8 hours PRN Nausea and/or Vomiting      I&O's Summary    10 Nov 2023 07:01  -  11 Nov 2023 07:00  --------------------------------------------------------  IN: 1750 mL / OUT: 3300 mL / NET: -1550 mL        PHYSICAL EXAM:  Vital Signs Last 24 Hrs  T(C): 36.7 (11 Nov 2023 05:10), Max: 37.6 (10 Nov 2023 21:15)  T(F): 98.1 (11 Nov 2023 05:10), Max: 99.6 (10 Nov 2023 21:15)  HR: 112 (11 Nov 2023 05:10) (105 - 112)  BP: 125/74 (11 Nov 2023 05:10) (124/77 - 145/95)  BP(mean): --  RR: 17 (11 Nov 2023 05:10) (17 - 22)  SpO2: 95% (11 Nov 2023 05:10) (93% - 95%)    Parameters below as of 11 Nov 2023 05:10  Patient On (Oxygen Delivery Method): room air      CONSTITUTIONAL: NAD  ENMT: Moist oral mucosa  RESPIRATORY: Normal respiratory effort; lungs are clear to auscultation bilaterally; No wheezes or rales  CARDIOVASCULAR: Regular rate and rhythm; Normal S1 and S2; No murmurs, rubs, or gallops; No lower extremity edema  ABDOMEN: Soft, Nontender, Nondistended; Bowel sounds present  MUSCULOSKELETAL:  No clubbing or cyanosis of digits; No joint swelling or tenderness to palpation  PSYCH: AAOx3 (oriented to person, place, and time); affect appropriate  NEUROLOGY: No focal deficits  SKIN: No rashes; No palpable lesions    LABS:                        13.5   12.24 )-----------( 153      ( 11 Nov 2023 05:28 )             41.4     11-11    134<L>  |  100  |  26<H>  ----------------------------<  110<H>  4.5   |  23  |  1.74<H>    Ca    9.1      11 Nov 2023 05:28    TPro  7.5  /  Alb  4.5  /  TBili  0.2  /  DBili  x   /  AST  14  /  ALT  9   /  AlkPhos  68  11-10    PT/INR - ( 10 Nov 2023 02:00 )   PT: 12.2 sec;   INR: 1.08 ratio         PTT - ( 10 Nov 2023 02:00 )  PTT:29.8 sec      Urinalysis Basic - ( 11 Nov 2023 05:28 )    Color: x / Appearance: x / SG: x / pH: x  Gluc: 110 mg/dL / Ketone: x  / Bili: x / Urobili: x   Blood: x / Protein: x / Nitrite: x   Leuk Esterase: x / RBC: x / WBC x   Sq Epi: x / Non Sq Epi: x / Bacteria: x        Culture - Blood (collected 10 Nov 2023 02:10)  Source: .Blood Blood-Peripheral  Preliminary Report (11 Nov 2023 07:01):    No growth at 24 hours    Culture - Urine (collected 10 Nov 2023 02:05)  Source: Clean Catch Clean Catch (Midstream)  Final Report (11 Nov 2023 07:54):    No growth    Culture - Blood (collected 10 Nov 2023 01:55)  Source: .Blood Blood-Peripheral  Preliminary Report (11 Nov 2023 07:01):    No growth at 24 hours      SARS-CoV-2: NotDete (10 Nov 2023 02:34)

## 2023-11-16 NOTE — PROGRESS NOTE ADULT - PROBLEM SELECTOR PLAN 3
Moderate bilateral hydroureteronephrosis, distended urinary bladder and enlarged prostate gland.  -s/p Christopher catheter placement by Urology  -c/w home tamsulosin  -cont proscar per Urology  - Maintain strict intake and output   - Patient was started on Cipro by outpatient oncologist for concern for UTI, will continue (dose reduced due to KAMI)  - Keep Christopher for at least 5 days per Urology  -Patient will follow-up with established outpatient Urologist, hs an appointment next Monday 11/13, however pt may need to reschedule as pt in the hospital now
Troponin 74--> 71  NO chest pain, EKG without ishcemic changes  - likely elevated due to renal dysfunction, low suspicion this is cardiac in origin
Moderate bilateral hydroureteronephrosis, distended urinary bladder and enlarged prostate gland.  -s/p Jeronimo catheter placement by Urology  -c/w home tamsulosin  -cont proscar per Urology  - Maintain strict intake and output   - Patient was started on Cipro by outpatient oncologist for concern for UTI, will continue (dose reduced due to KAMI)  - Keep Jeronimo for at least 5 days per Urology, failing tov, may need jeronimo replacement  -Patient will follow-up with established outpatient Urologist, hs an appointment next Monday 11/13, however pt may need to reschedule as pt in the hospital now
Moderate bilateral hydroureteronephrosis, distended urinary bladder and enlarged prostate gland.  -s/p Jeronimo catheter placement by Urology  -c/w home tamsulosin  -cont proscar per Urology  - Maintain strict intake and output   - Patient was started on Cipro by outpatient oncologist for concern for UTI, will continue (dose reduced due to KAMI)  - Failed tov, jeronimo replaced
Moderate bilateral hydroureteronephrosis, distended urinary bladder and enlarged prostate gland.  -s/p Christopher catheter placement by Urology  -c/w home tamsulosin  -cont proscar per Urology  - Maintain strict intake and output   - Patient was started on Cipro by outpatient oncologist for concern for UTI, will continue (dose reduced due to KAMI)  - Keep Christopher for at least 5 days per Urology, TOV tonight if ok with urology  -Patient will follow-up with established outpatient Urologist, hs an appointment next Monday 11/13, however pt may need to reschedule as pt in the hospital now
Moderate bilateral hydroureteronephrosis, distended urinary bladder and enlarged prostate gland.  -s/p Christopher catheter placement by Urology  -c/w home tamsulosin  -add proscar per Urology  - Maintain strict intake and output   - Patient was started on Cipro by outpatient oncologist for concern for UTI, will continue (dose reduced due to KAMI)  - Keep Christopher for at least 5 days per Urology  -Patient will follow-up with established outpatient Urologist, chelesa an appointment next Monday 11/13

## 2023-11-16 NOTE — PHARMACOTHERAPY INTERVENTION NOTE - COMMENTS
Discharge medications reviewed with the patient. Outpatient medication schedule was discussed in detail including: medication name, indication, dose, administration times, treatment duration, side effects, drug interactions, and special instructions. Patient questions and concerns were answered and addressed. Patient demonstrated understanding. Informed patient that medication list may change prior to discharge. Patient provided with educational handout.    Hector Ramos PharmD  Clinical Pharmacy Specialist  [phone]

## 2023-11-16 NOTE — PROGRESS NOTE ADULT - PROBLEM SELECTOR PLAN 4
diagnosed in 2009 s/p resection followed by CRT with subsequent metastatic spread currently on targeted therapy with Lorbrena (lorlatinib)  - Follows with Dr. Nissa Luna at [facility].   - S/p gamma knife for brain metse   - Onc recs appreciated - - On treatment with lorlatinib, hold for now given KAMI. May resume upon discharge
diagnosed in 2009 s/p resection followed by CRT with subsequent metastatic spread currently on targeted therapy with Lorbrena (lorlatinib)  - Follows with Dr. Nissa Luna at [facility].   - Onc recs appreciated - - On treatment with lorlatinib, hold for now given KAMI. May resume upon discharge
diagnosed in 2009 s/p resection followed by CRT with subsequent metastatic spread currently on targeted therapy with Lorbrena (lorlatinib)  - Follows with Dr. Nissa Luna at [facility].   - S/p gamma knife for brain metse   - Onc recs appreciated - - On treatment with lorlatinib, hold for now given KAMI. May resume upon discharge
diagnosed in 2009 s/p resection followed by CRT with subsequent metastatic spread currently on targeted therapy with Lorbrena (lorlatinib)  - Follows with Dr. Nissa Luna at [facility].   - S/p gamma knife for brain metse   - Onc recs appreciated - - On treatment with lorlatinib, hold for now given KAMI. May resume upon discharge
diagnosed in 2009 s/p resection followed by CRT with subsequent metastatic spread currently on targeted therapy with Lorbrena (lorlatinib)  - Follows with Dr. Nissa Luna at [facility].   - S/p gamma knife for brain metse   - Onc recs appreciated - - On treatment with lorlatinib, hold for now given KAMI. May resume upon discharge
Patent
diagnosed in 2009 s/p resection followed by CRT with subsequent metastatic spread currently on targeted therapy with Lorbrena (lorlatinib)  - Follows with Dr. Nissa Luna at [facility].   - S/p gamma knife for brain metse   - Onc recs appreciated - - On treatment with lorlatinib, hold for now given KAMI. May resume upon discharge

## 2023-11-16 NOTE — PROGRESS NOTE ADULT - PROVIDER SPECIALTY LIST ADULT
Cardiology
Heme/Onc
Hospitalist
Heme/Onc
Hospitalist

## 2023-11-16 NOTE — DISCHARGE NOTE NURSING/CASE MANAGEMENT/SOCIAL WORK - NSDCPEFALRISK_GEN_ALL_CORE
For information on Fall & Injury Prevention, visit: [email or web address] OR  [email or web address] OR  https://www.cdc.gov/steadi/patient.html

## 2023-11-16 NOTE — DISCHARGE NOTE NURSING/CASE MANAGEMENT/SOCIAL WORK - PATIENT PORTAL LINK FT
You can access the FollowMyHealth Patient Portal offered by [facility] by registering at the following website: [email or web address]. By joining [employer]’s FollowMyHealth portal, you will also be able to view your health information using other applications (apps) compatible with our system.

## 2023-11-16 NOTE — PROGRESS NOTE ADULT - PROBLEM SELECTOR PLAN 1
Patient acutely hypoxic to 70s/80s overnight and worsening tachycardia to 130s  - D-dimer > 5K   - Urgent CTA chest showing "Extensive bilateral pulmonary arterial pulmonary emboli with involvement of all lobes... Evidence of right heart strain with RV/LV"  - Cont heparin gtt transition to eliquis  - PERT team evaluation done no intervention  - Trops checked, downtrending from admission  - TTE showed  Left ventricular systolic function is mildly decreased with an ejection fraction. Global left ventricular hypokinesis. Severely enlarged right ventricular cavity size and severely reduced systolic function. Moderate pulmonary hypertension.  Moderate to large pericardial effusion. No echocardiographic evidence of cardiac tamponade.  - Monitor on tele/, q4h vital signs   - O2 as needed, currently on 2-3L NC   - b/l LE dopplers showed No evidence of deep venous thrombosis in either lower extremity.  -f/u cards recs

## 2023-11-21 ENCOUNTER — NON-APPOINTMENT (OUTPATIENT)
Age: 63
End: 2023-11-21

## 2023-11-21 ENCOUNTER — APPOINTMENT (OUTPATIENT)
Dept: CARDIOLOGY | Facility: CLINIC | Age: 63
End: 2023-11-21
Payer: COMMERCIAL

## 2023-11-21 VITALS
BODY MASS INDEX: 22.98 KG/M2 | OXYGEN SATURATION: 97 % | DIASTOLIC BLOOD PRESSURE: 84 MMHG | TEMPERATURE: 98.4 F | WEIGHT: 143 LBS | HEART RATE: 77 BPM | SYSTOLIC BLOOD PRESSURE: 144 MMHG | HEIGHT: 66 IN

## 2023-11-21 PROCEDURE — 93000 ELECTROCARDIOGRAM COMPLETE: CPT

## 2023-11-21 PROCEDURE — 99204 OFFICE O/P NEW MOD 45 MIN: CPT | Mod: 25

## 2024-01-03 ENCOUNTER — TRANSCRIPTION ENCOUNTER (OUTPATIENT)
Age: 64
End: 2024-01-03

## 2024-01-03 RX ORDER — APIXABAN 5 MG/1
5 TABLET, FILM COATED ORAL
Qty: 180 | Refills: 1 | Status: ACTIVE | COMMUNITY
Start: 2024-01-03 | End: 1900-01-01

## 2024-01-05 ENCOUNTER — APPOINTMENT (OUTPATIENT)
Dept: CARDIOLOGY | Facility: CLINIC | Age: 64
End: 2024-01-05
Payer: COMMERCIAL

## 2024-01-05 PROCEDURE — 93306 TTE W/DOPPLER COMPLETE: CPT

## 2024-02-02 NOTE — HISTORY OF PRESENT ILLNESS
[Home] : at home, [unfilled] , at the time of the visit. [Medical Office: ([facility])___] : at the medical office located in  [FreeTextEntry1] : Mr Lowe is a 62 year old male with PMH of testicular cancer s/p orchiectomy (seminoma stage I) (2010), lung adenocarcinoma (Alk mutant) diagnosed in 2009 s/p resection followed by CRT with subsequent metastatic spread currently on targeted therapy with Lorbrena (lorlatinib). Had complete resolution of brain mets initially with systemic therapy, with subsequent metastatic recurrence. He now presents with new findings of asymptomatic brain mets within the left temporal and occipital lobes.\par \par 12/21/22: 1/1 Fx of 2000 cGy each to L. occipital, L.temporal and R. frontal lobes completed.\par \par 01/05/23: Left knee steroid (A mixture of 1cc DepoMedrol 40mg/ml, 3cc Lidocaine 1%, and 3cc Bupivacaine 0.5% 0 injection was given to patient by Dr John Goodman with 6-8 weeks PT ordered. \par \par 2/24/23. Patient present today for on treatment evaluation. Visit was provided via telehealth using real-time 2 way audio visual technology. .......

## 2024-02-02 NOTE — HISTORY OF PRESENT ILLNESS
[Home] : at home, [unfilled] , at the time of the visit. [Medical Office: ([facility])___] : at the medical office located in  [FreeTextEntry1] : Mr Lowe is a 62 year old male with PMH of testicular cancer s/p orchiectomy (seminoma stage I) (2010), lung adenocarcinoma (Alk mutant) diagnosed in 2009 s/p resection followed by CRT with subsequent metastatic spread currently on targeted therapy with Lorbrena (lorlatinib). Had complete resolution of brain mets initially with systemic therapy, with subsequent metastatic recurrence. He now presents with new findings of asymptomatic brain mets within the left temporal and occipital lobes.\par \par 12/21/22: 1/1 Fx of 2000 cGy each to L. occipital, L.temporal and R. frontal lobes completed.\par \par 01/05/23: Left knee steroid (A mixture of 1cc DepoMedrol 40mg/ml, 3cc Lidocaine 1%, and 3cc Bupivacaine 0.5% 0 injection was given to patient by Dr John Goodman with 6-8 weeks PT ordered. \par \par 2/24/23. Patient present today for on treatment evaluation. Visit was provided via telehealth using real-time 2 way audio visual technology. .......

## 2024-02-02 NOTE — DISEASE MANAGEMENT
[Clinical] : TNM Stage: c [IV] : IV [TTNM] : x [NTNM] : x [MTNM] : 1 [de-identified] : 6,000cGy [de-identified] : 6000 cGy [de-identified] : 2000 cGy each to L. occipital, L. temporal and R. frontal lobes

## 2024-02-02 NOTE — REVIEW OF SYSTEMS
[Negative] : Allergic/Immunologic [FreeTextEntry9] : s/p left knee meniscectomy [FreeTextEntry8] : urinary retention, on Flomax

## 2024-02-02 NOTE — DISEASE MANAGEMENT
[Clinical] : TNM Stage: c [IV] : IV [TTNM] : x [NTNM] : x [MTNM] : 1 [de-identified] : 6,000cGy [de-identified] : 6000 cGy [de-identified] : 2000 cGy each to L. occipital, L. temporal and R. frontal lobes

## 2024-02-16 ENCOUNTER — APPOINTMENT (OUTPATIENT)
Dept: CARDIOLOGY | Facility: CLINIC | Age: 64
End: 2024-02-16
Payer: COMMERCIAL

## 2024-02-16 VITALS
HEART RATE: 78 BPM | OXYGEN SATURATION: 99 % | BODY MASS INDEX: 24.05 KG/M2 | SYSTOLIC BLOOD PRESSURE: 143 MMHG | TEMPERATURE: 98 F | WEIGHT: 149 LBS | DIASTOLIC BLOOD PRESSURE: 75 MMHG

## 2024-02-16 DIAGNOSIS — I26.09 OTHER PULMONARY EMBOLISM WITH ACUTE COR PULMONALE: ICD-10-CM

## 2024-02-16 PROCEDURE — 93000 ELECTROCARDIOGRAM COMPLETE: CPT

## 2024-02-16 PROCEDURE — 99215 OFFICE O/P EST HI 40 MIN: CPT | Mod: 25

## 2024-02-16 PROCEDURE — G2211 COMPLEX E/M VISIT ADD ON: CPT | Mod: NC,1L

## 2024-03-17 NOTE — DISCUSSION/SUMMARY
[EKG obtained to assist in diagnosis and management of assessed problem(s)] : EKG obtained to assist in diagnosis and management of assessed problem(s) [FreeTextEntry1] : 63M w hx of testicular CA, lung CA with hx of brain mets s/p treatment, PE 11/23 presents for f/u  PE -unclear etiology ?unprovoked, pt with met CA -still on eliquis -plan for at least 6 month course -has f/u with hem onc in march, will discuss -per chart: sent out on Eliquis, states was recommended over lovenox by heme onc team in [facility] prior to d/c -cont Eliquis, will be on for at least 6 month course -repeat echo in 1/24 showing preserved RV function  -f/u 6 months 40 mins spent on complete encounter   ******ADDENDUM 3/17/24****** pt planning prostate porceure on 4/4/24 pt without cp or sob no syncope, VT VF SCD recent PE 11/23 on a/c  TTE 1/24 showing preserved LV and RV function, no sevre valvular lesions  given PE, pt is intermediate to high risk for any procedure, as prostate procedure is urgent no further testing is indicated and pt may proceed. pt is on a/c (managed by heme onc team) inital plan was for 6 months (through 5/24) a/c mgmt per heme onc.

## 2024-03-17 NOTE — REVIEW OF SYSTEMS
[Fever] : no fever [Headache] : no headache [Weight Gain (___ Lbs)] : no recent weight gain [Chills] : no chills [Weight Loss (___ Lbs)] : no recent weight loss [Blurry Vision] : no blurred vision [Feeling Fatigued] : not feeling fatigued [Sore Throat] : no sore throat [SOB] : no shortness of breath [Dyspnea on exertion] : not dyspnea during exertion [Chest Discomfort] : no chest discomfort [Orthopnea] : no orthopnea [Palpitations] : no palpitations [Lower Ext Edema] : no extremity edema [Cough] : no cough [Syncope] : no syncope [Nausea] : no nausea [Wheezing] : no wheezing [Vomiting] : no vomiting [Dizziness] : no dizziness [Easy Bruising] : no tendency for easy bruising [Easy Bleeding] : no tendency for easy bleeding [Confusion] : no confusion was observed

## 2024-03-17 NOTE — HISTORY OF PRESENT ILLNESS
[FreeTextEntry1] : 63M w hx of testicular CA, lung CA with hx of brain mets s/p treatment, PE 11/23 presents for f/u Sent in by: MARI d/silvino PMD:  previously,  pt hospitalized at [facility] 11/23 for SOB, found to have b/l PE with R heart strain. pt started on heparin drip, transitioned to heparin. last seen here 11/23 for an initial eval, feeling well, on eliquis (from heme onc team at [facility]). no cp sob.   pt now presents for follow up. today,  pt feeling well, no cp sob, no syncope. no palpitations, ZEE. still on eliquis.    pts daughter recently passed away from breast CA, pt very upset today.   Denies dizziness, diaphoresis, LE edema, orthopnea  Exercise: wants to start Diet: none   Prev cardiac history: PE Previous cardiac testing: tte 11/23 Recent labs:   Med hx: hx of testicular CA, lung CA with hx of brain mets s/p gamma knife treatment, PE 11/23, HLD Sx hx: lung lobestomy, testicular sx  Family hx: no known cardiac hx Social hx: lives in [place] with wife. retired. no tob/etoh/drugs Meds: eliquis, lobrena (CA meds) finasteride, tamsulosin crestor Allergies: nkda

## 2024-03-17 NOTE — CARDIOLOGY SUMMARY
[de-identified] : 11/13: CONCLUSIONS:    1. Left ventricular cavity is normal. Left ventricular wall thickness is normal. The interventricular septum is flattened in systole consistent with right ventricular pressure overload. Left ventricular systolic function is mildly decreased with an ejection fraction visually estimated at 45 to 50 %. Global left ventricular hypokinesis.  2. Severely enlarged right ventricular cavity size and severely reduced systolic function.  3. Structurally normal mitral valve with normal leaflet excursion. There is calcification of the mitral valve annulus. There is trace mitral regurgitation.  4. The aortic valve appears trileaflet with normal systolic excursion. There is calcification of the aortic valve leaflets. There is mild aortic regurgitation.  5. The right atrium is moderately dilated in size with an indexed volume of 35.69 ml/m and an indexed area of 10.45 cm/m.  6. Estimated pulmonary artery systolic pressure is 53 mmHg, consistent with moderate pulmonary hypertension.  7. Moderate to large pericardial effusion, measuring ~ 1.9 cm adjacent to the distal right ventricular free wall (best seen in the apical view). No echocardiographic evidence of cardiac tamponade.  8. No prior echocardiogram is available for comparison.

## 2024-03-20 ENCOUNTER — APPOINTMENT (OUTPATIENT)
Dept: MRI IMAGING | Facility: CLINIC | Age: 64
End: 2024-03-20
Payer: MEDICARE

## 2024-03-20 ENCOUNTER — OUTPATIENT (OUTPATIENT)
Dept: OUTPATIENT SERVICES | Facility: HOSPITAL | Age: 64
LOS: 1 days | End: 2024-03-20
Payer: MEDICARE

## 2024-03-20 DIAGNOSIS — Z00.00 ENCOUNTER FOR GENERAL ADULT MEDICAL EXAMINATION WITHOUT ABNORMAL FINDINGS: ICD-10-CM

## 2024-03-20 DIAGNOSIS — Z90.79 ACQUIRED ABSENCE OF OTHER GENITAL ORGAN(S): Chronic | ICD-10-CM

## 2024-03-20 PROCEDURE — 72197 MRI PELVIS W/O & W/DYE: CPT | Mod: 26,MH

## 2024-03-20 PROCEDURE — 72197 MRI PELVIS W/O & W/DYE: CPT

## 2024-03-20 PROCEDURE — 76498P: CUSTOM | Mod: 26,MH

## 2024-03-20 PROCEDURE — A9585: CPT

## 2024-03-20 PROCEDURE — 76498 UNLISTED MR PROCEDURE: CPT

## 2024-04-11 ENCOUNTER — APPOINTMENT (OUTPATIENT)
Age: 64
End: 2024-04-11

## 2024-04-15 RX ORDER — TAMSULOSIN HYDROCHLORIDE 0.4 MG/1
0.4 CAPSULE ORAL
Qty: 90 | Refills: 2 | Status: DISCONTINUED | COMMUNITY
Start: 2021-04-01 | End: 2024-04-15

## 2024-04-15 RX ORDER — TAMSULOSIN HYDROCHLORIDE 0.4 MG/1
0.4 CAPSULE ORAL
Qty: 90 | Refills: 3 | Status: DISCONTINUED | COMMUNITY
Start: 2023-01-19 | End: 2024-04-15

## 2024-04-19 NOTE — ED ADULT TRIAGE NOTE - GLASGOW COMA SCALE: EYE OPENING, MLM
Problem: ABCDS Injury Assessment  Goal: Absence of physical injury  4/19/2024 1257 by Citlalli Watson RN  Outcome: Progressing     Problem: Skin/Tissue Integrity  Goal: Absence of new skin breakdown  Description: 1.  Monitor for areas of redness and/or skin breakdown  2.  Assess vascular access sites hourly  3.  Every 4-6 hours minimum:  Change oxygen saturation probe site  4.  Every 4-6 hours:  If on nasal continuous positive airway pressure, respiratory therapy assess nares and determine need for appliance change or resting period.  4/19/2024 1257 by Citlalli Watson RN  Outcome: Progressing     Problem: Pain  Goal: Verbalizes/displays adequate comfort level or baseline comfort level  4/19/2024 1257 by Citlalli Watson RN  Outcome: Progressing     Problem: Safety - Adult  Goal: Free from fall injury  4/19/2024 1257 by Citlalli Watson RN  Outcome: Progressing     Problem: Discharge Planning  Goal: Discharge to home or other facility with appropriate resources  4/19/2024 1257 by Citlalli Watson RN  Outcome: Progressing     Problem: Skin/Tissue Integrity - Adult  Goal: Incisions, wounds, or drain sites healing without S/S of infection  4/19/2024 1257 by Citlalli Watson RN  Outcome: Progressing     Problem: Infection - Adult  Goal: Absence of infection during hospitalization  4/19/2024 1257 by Citlalli Watson RN  Outcome: Progressing     Problem: Chronic Conditions and Co-morbidities  Goal: Patient's chronic conditions and co-morbidity symptoms are monitored and maintained or improved  4/19/2024 1257 by Citlalli Watson RN  Outcome: Progressing     Problem: Nutrition Deficit:  Goal: Optimize nutritional status  4/19/2024 1257 by Citlalli Watson RN  Outcome: Progressing    Care plan reviewed with patient , patient  verbalized understanding of plan of care and contributed to goal setting.         (E4) spontaneous

## 2024-04-25 ENCOUNTER — APPOINTMENT (OUTPATIENT)
Dept: MRI IMAGING | Facility: CLINIC | Age: 64
End: 2024-04-25
Payer: MEDICARE

## 2024-04-25 ENCOUNTER — OUTPATIENT (OUTPATIENT)
Dept: OUTPATIENT SERVICES | Facility: HOSPITAL | Age: 64
LOS: 1 days | End: 2024-04-25
Payer: MEDICARE

## 2024-04-25 DIAGNOSIS — C34.31 MALIGNANT NEOPLASM OF LOWER LOBE, RIGHT BRONCHUS OR LUNG: ICD-10-CM

## 2024-04-25 DIAGNOSIS — R97.20 ELEVATED PROSTATE SPECIFIC ANTIGEN [PSA]: ICD-10-CM

## 2024-04-25 DIAGNOSIS — C79.31 SECONDARY MALIGNANT NEOPLASM OF BRAIN: ICD-10-CM

## 2024-04-25 DIAGNOSIS — Z51.11 ENCOUNTER FOR ANTINEOPLASTIC CHEMOTHERAPY: ICD-10-CM

## 2024-04-25 DIAGNOSIS — Z90.79 ACQUIRED ABSENCE OF OTHER GENITAL ORGAN(S): Chronic | ICD-10-CM

## 2024-04-25 DIAGNOSIS — C77.0 SECONDARY AND UNSPECIFIED MALIGNANT NEOPLASM OF LYMPH NODES OF HEAD, FACE AND NECK: ICD-10-CM

## 2024-04-25 PROCEDURE — A9585: CPT

## 2024-04-25 PROCEDURE — 70553 MRI BRAIN STEM W/O & W/DYE: CPT | Mod: 26,MH

## 2024-04-25 PROCEDURE — 70553 MRI BRAIN STEM W/O & W/DYE: CPT | Mod: MH

## 2024-05-08 ENCOUNTER — OUTPATIENT (OUTPATIENT)
Dept: OUTPATIENT SERVICES | Facility: HOSPITAL | Age: 64
LOS: 1 days | End: 2024-05-08
Payer: MEDICARE

## 2024-05-08 ENCOUNTER — APPOINTMENT (OUTPATIENT)
Dept: MAMMOGRAPHY | Facility: CLINIC | Age: 64
End: 2024-05-08
Payer: MEDICARE

## 2024-05-08 DIAGNOSIS — Z90.79 ACQUIRED ABSENCE OF OTHER GENITAL ORGAN(S): Chronic | ICD-10-CM

## 2024-05-08 DIAGNOSIS — C34.31 MALIGNANT NEOPLASM OF LOWER LOBE, RIGHT BRONCHUS OR LUNG: ICD-10-CM

## 2024-05-08 PROCEDURE — 76642 ULTRASOUND BREAST LIMITED: CPT | Mod: 26,RT

## 2024-05-08 PROCEDURE — G0279: CPT

## 2024-05-08 PROCEDURE — G0279: CPT | Mod: 26

## 2024-05-08 PROCEDURE — 77066 DX MAMMO INCL CAD BI: CPT

## 2024-05-08 PROCEDURE — 77066 DX MAMMO INCL CAD BI: CPT | Mod: 26

## 2024-05-08 PROCEDURE — 76642 ULTRASOUND BREAST LIMITED: CPT

## 2024-05-23 ENCOUNTER — APPOINTMENT (OUTPATIENT)
Dept: CT IMAGING | Facility: CLINIC | Age: 64
End: 2024-05-23
Payer: MEDICARE

## 2024-05-23 ENCOUNTER — OUTPATIENT (OUTPATIENT)
Dept: OUTPATIENT SERVICES | Facility: HOSPITAL | Age: 64
LOS: 1 days | End: 2024-05-23
Payer: MEDICARE

## 2024-05-23 DIAGNOSIS — Z00.8 ENCOUNTER FOR OTHER GENERAL EXAMINATION: ICD-10-CM

## 2024-05-23 DIAGNOSIS — Z90.79 ACQUIRED ABSENCE OF OTHER GENITAL ORGAN(S): Chronic | ICD-10-CM

## 2024-05-23 PROCEDURE — 71260 CT THORAX DX C+: CPT | Mod: 26,MH

## 2024-05-23 PROCEDURE — 74177 CT ABD & PELVIS W/CONTRAST: CPT | Mod: MH

## 2024-05-23 PROCEDURE — 71260 CT THORAX DX C+: CPT | Mod: MH

## 2024-05-23 PROCEDURE — 74177 CT ABD & PELVIS W/CONTRAST: CPT | Mod: 26,MH

## 2024-05-31 ENCOUNTER — RESULT REVIEW (OUTPATIENT)
Age: 64
End: 2024-05-31

## 2024-05-31 ENCOUNTER — OUTPATIENT (OUTPATIENT)
Dept: OUTPATIENT SERVICES | Facility: HOSPITAL | Age: 64
LOS: 1 days | End: 2024-05-31
Payer: MEDICARE

## 2024-05-31 ENCOUNTER — APPOINTMENT (OUTPATIENT)
Dept: ULTRASOUND IMAGING | Facility: CLINIC | Age: 64
End: 2024-05-31
Payer: MEDICARE

## 2024-05-31 DIAGNOSIS — C34.31 MALIGNANT NEOPLASM OF LOWER LOBE, RIGHT BRONCHUS OR LUNG: ICD-10-CM

## 2024-05-31 DIAGNOSIS — N40.1 BENIGN PROSTATIC HYPERPLASIA WITH LOWER URINARY TRACT SYMPTOMS: ICD-10-CM

## 2024-05-31 DIAGNOSIS — Z90.79 ACQUIRED ABSENCE OF OTHER GENITAL ORGAN(S): Chronic | ICD-10-CM

## 2024-05-31 DIAGNOSIS — R92.8 OTHER ABNORMAL AND INCONCLUSIVE FINDINGS ON DIAGNOSTIC IMAGING OF BREAST: ICD-10-CM

## 2024-05-31 DIAGNOSIS — R97.20 ELEVATED PROSTATE SPECIFIC ANTIGEN [PSA]: ICD-10-CM

## 2024-05-31 PROCEDURE — 77065 DX MAMMO INCL CAD UNI: CPT

## 2024-05-31 PROCEDURE — 88305 TISSUE EXAM BY PATHOLOGIST: CPT

## 2024-05-31 PROCEDURE — 77065 DX MAMMO INCL CAD UNI: CPT | Mod: 26,RT

## 2024-05-31 PROCEDURE — 88305 TISSUE EXAM BY PATHOLOGIST: CPT | Mod: 26

## 2024-05-31 PROCEDURE — A4648: CPT

## 2024-05-31 PROCEDURE — 19083 BX BREAST 1ST LESION US IMAG: CPT | Mod: RT

## 2024-05-31 PROCEDURE — 19083 BX BREAST 1ST LESION US IMAG: CPT

## 2024-08-05 NOTE — PROGRESS NOTE ADULT - PROBLEM SELECTOR PLAN 5
-c/w home statin
verbal instruction

## 2024-09-06 ENCOUNTER — APPOINTMENT (OUTPATIENT)
Dept: ULTRASOUND IMAGING | Facility: CLINIC | Age: 64
End: 2024-09-06
Payer: MEDICARE

## 2024-09-06 PROCEDURE — 76882 US LMTD JT/FCL EVL NVASC XTR: CPT | Mod: RT

## 2024-09-17 ENCOUNTER — APPOINTMENT (OUTPATIENT)
Dept: CARDIOLOGY | Facility: CLINIC | Age: 64
End: 2024-09-17
Payer: MEDICARE

## 2024-09-17 ENCOUNTER — NON-APPOINTMENT (OUTPATIENT)
Age: 64
End: 2024-09-17

## 2024-09-17 VITALS
WEIGHT: 152.19 LBS | HEIGHT: 66 IN | BODY MASS INDEX: 24.46 KG/M2 | HEART RATE: 73 BPM | OXYGEN SATURATION: 97 % | DIASTOLIC BLOOD PRESSURE: 74 MMHG | TEMPERATURE: 98.2 F | SYSTOLIC BLOOD PRESSURE: 135 MMHG

## 2024-09-17 DIAGNOSIS — I26.09 OTHER PULMONARY EMBOLISM WITH ACUTE COR PULMONALE: ICD-10-CM

## 2024-09-17 PROCEDURE — 93000 ELECTROCARDIOGRAM COMPLETE: CPT

## 2024-09-17 PROCEDURE — G2211 COMPLEX E/M VISIT ADD ON: CPT | Mod: NC

## 2024-09-17 PROCEDURE — 99215 OFFICE O/P EST HI 40 MIN: CPT | Mod: 25

## 2024-09-17 NOTE — REVIEW OF SYSTEMS
[Fever] : no fever [Headache] : no headache [Weight Gain (___ Lbs)] : no recent weight gain [Chills] : no chills [Feeling Fatigued] : not feeling fatigued [Weight Loss (___ Lbs)] : no recent weight loss [Blurry Vision] : no blurred vision [Sore Throat] : no sore throat [SOB] : no shortness of breath [Dyspnea on exertion] : not dyspnea during exertion [Chest Discomfort] : no chest discomfort [Lower Ext Edema] : no extremity edema [Palpitations] : no palpitations [Orthopnea] : no orthopnea [Syncope] : no syncope [Cough] : no cough [Wheezing] : no wheezing [Nausea] : no nausea [Vomiting] : no vomiting [Dizziness] : no dizziness [Easy Bleeding] : no tendency for easy bleeding [Confusion] : no confusion was observed [Easy Bruising] : no tendency for easy bruising

## 2024-09-17 NOTE — HISTORY OF PRESENT ILLNESS
[FreeTextEntry1] : 64M w hx of testicular CA, lung CA with hx of brain mets s/p treatment, PE 11/23 presents for f/u Sent in by: MARI d/silvino PMD:  previously, pt hospitalized at [facility] 11/23 for SOB, found to have b/l PE with R heart strain. pt started on heparin drip, transitioned to heparin. 11/23 for an initial eval, feeling well, on eliquis (from heme onc team at [facility]). no cp sob. last seen 2/24, feeling well. planning prostate surgery. pts daughter passed away 2/24 from breast CA, pt very upset.   pt now presents for follow up. today,  pt feeling well, no cp sob, no syncope. no palpitations, ZEE. still on eliquis. pt is exercising daily, no issues Denies dizziness, diaphoresis, LE edema, orthopnea pt tolerated prostate sx without cv issues, feeling better.   Exercise: daily, no issues Diet: none  Prev cardiac history: PE Previous cardiac testing: tte 11/23 Recent labs:  Med hx: hx of testicular CA, lung CA with hx of brain mets s/p gamma knife treatment, PE 11/23, HLD Sx hx: lung lobestomy, testicular sx Family hx: no known cardiac hx Social hx: lives in [place] with wife. retired. no tob/etoh/drugs Meds: eliquis, lobrena (CA meds) finasteride, tamsulosin crestor Allergies: nkda

## 2024-09-17 NOTE — CARDIOLOGY SUMMARY
[de-identified] : 11/13: CONCLUSIONS:    1. Left ventricular cavity is normal. Left ventricular wall thickness is normal. The interventricular septum is flattened in systole consistent with right ventricular pressure overload. Left ventricular systolic function is mildly decreased with an ejection fraction visually estimated at 45 to 50 %. Global left ventricular hypokinesis.  2. Severely enlarged right ventricular cavity size and severely reduced systolic function.  3. Structurally normal mitral valve with normal leaflet excursion. There is calcification of the mitral valve annulus. There is trace mitral regurgitation.  4. The aortic valve appears trileaflet with normal systolic excursion. There is calcification of the aortic valve leaflets. There is mild aortic regurgitation.  5. The right atrium is moderately dilated in size with an indexed volume of 35.69 ml/m and an indexed area of 10.45 cm/m.  6. Estimated pulmonary artery systolic pressure is 53 mmHg, consistent with moderate pulmonary hypertension.  7. Moderate to large pericardial effusion, measuring ~ 1.9 cm adjacent to the distal right ventricular free wall (best seen in the apical view). No echocardiographic evidence of cardiac tamponade.  8. No prior echocardiogram is available for comparison.

## 2024-09-17 NOTE — DISCUSSION/SUMMARY
[FreeTextEntry1] : 64M w hx of testicular CA, lung CA with hx of brain mets s/p treatment, PE 11/23 presents for f/u  PE -unclear etiology ?unprovoked, pt with met CA -still on eliquis -plan for at least 6 month course. pt states he spoke with his PMD and will be continuing it  -repeat echo in 1/24 showing preserved RV function -no cp or sob at rest or on exertion  -f/u 6 months 40 mins spent on complete encounter   ******ADDENDUM 3/17/24****** pt planning prostate porceure on 4/4/24 pt without cp or sob no syncope, VT VF SCD recent PE 11/23 on a/c  TTE 1/24 showing preserved LV and RV function, no sevre valvular lesions  given PE, pt is intermediate to high risk for any procedure, as prostate procedure is urgent no further testing is indicated and pt may proceed. pt is on a/c (managed by heme onc team) inital plan was for 6 months (through 5/24) a/c mgmt per heme onc.    [EKG obtained to assist in diagnosis and management of assessed problem(s)] : EKG obtained to assist in diagnosis and management of assessed problem(s)

## 2024-09-17 NOTE — HISTORY OF PRESENT ILLNESS
[FreeTextEntry1] : 64M w hx of testicular CA, lung CA with hx of brain mets s/p treatment, PE 11/23 presents for f/u Sent in by: MARI d/silvino PMD:  previously, pt hospitalized at [facility] 11/23 for SOB, found to have b/l PE with R heart strain. pt started on heparin drip, transitioned to heparin. 11/23 for an initial eval, feeling well, on eliquis (from heme onc team at [facility]). no cp sob. last seen 2/24, feeling well. planning prostate surgery. pts daughter passed away 2/24 from breast CA, pt very upset.   pt now presents for follow up. today,  pt feeling well, no cp sob, no syncope. no palpitations, ZEE. still on eliquis. pt is exercising daily, no issues Denies dizziness, diaphoresis, LE edema, orthopnea pt tolerated prostate sx without cv issues, feeling better.   Exercise: daily, no issues Diet: none  Prev cardiac history: PE Previous cardiac testing: tte 11/23 Recent labs:  Med hx: hx of testicular CA, lung CA with hx of brain mets s/p gamma knife treatment, PE 11/23, HLD Sx hx: lung lobestomy, testicular sx Family hx: no known cardiac hx Social hx: lives in [place] with wife. retired. no tob/etoh/drugs Meds: eliquis, lobrena (CA meds) finasteride, tamsulosin crestor Allergies: nkda

## 2024-09-17 NOTE — CARDIOLOGY SUMMARY
[de-identified] : 11/13: CONCLUSIONS:    1. Left ventricular cavity is normal. Left ventricular wall thickness is normal. The interventricular septum is flattened in systole consistent with right ventricular pressure overload. Left ventricular systolic function is mildly decreased with an ejection fraction visually estimated at 45 to 50 %. Global left ventricular hypokinesis.  2. Severely enlarged right ventricular cavity size and severely reduced systolic function.  3. Structurally normal mitral valve with normal leaflet excursion. There is calcification of the mitral valve annulus. There is trace mitral regurgitation.  4. The aortic valve appears trileaflet with normal systolic excursion. There is calcification of the aortic valve leaflets. There is mild aortic regurgitation.  5. The right atrium is moderately dilated in size with an indexed volume of 35.69 ml/m and an indexed area of 10.45 cm/m.  6. Estimated pulmonary artery systolic pressure is 53 mmHg, consistent with moderate pulmonary hypertension.  7. Moderate to large pericardial effusion, measuring ~ 1.9 cm adjacent to the distal right ventricular free wall (best seen in the apical view). No echocardiographic evidence of cardiac tamponade.  8. No prior echocardiogram is available for comparison.

## 2024-11-14 ENCOUNTER — APPOINTMENT (OUTPATIENT)
Dept: CT IMAGING | Facility: CLINIC | Age: 64
End: 2024-11-14
Payer: MEDICARE

## 2024-11-14 ENCOUNTER — OUTPATIENT (OUTPATIENT)
Dept: OUTPATIENT SERVICES | Facility: HOSPITAL | Age: 64
LOS: 1 days | End: 2024-11-14
Payer: MEDICARE

## 2024-11-14 DIAGNOSIS — R97.20 ELEVATED PROSTATE SPECIFIC ANTIGEN [PSA]: ICD-10-CM

## 2024-11-14 DIAGNOSIS — N40.1 BENIGN PROSTATIC HYPERPLASIA WITH LOWER URINARY TRACT SYMPTOMS: ICD-10-CM

## 2024-11-14 DIAGNOSIS — Z90.79 ACQUIRED ABSENCE OF OTHER GENITAL ORGAN(S): Chronic | ICD-10-CM

## 2024-11-14 PROCEDURE — 70470 CT HEAD/BRAIN W/O & W/DYE: CPT | Mod: MH

## 2024-11-14 PROCEDURE — 74177 CT ABD & PELVIS W/CONTRAST: CPT | Mod: 26,MH

## 2024-11-14 PROCEDURE — 74177 CT ABD & PELVIS W/CONTRAST: CPT | Mod: MH

## 2024-11-14 PROCEDURE — 71260 CT THORAX DX C+: CPT | Mod: 26,MH

## 2024-11-14 PROCEDURE — 70470 CT HEAD/BRAIN W/O & W/DYE: CPT | Mod: 26,MH

## 2024-11-14 PROCEDURE — 71260 CT THORAX DX C+: CPT | Mod: MH

## 2024-12-12 ENCOUNTER — RX RENEWAL (OUTPATIENT)
Age: 64
End: 2024-12-12

## 2025-01-10 ENCOUNTER — APPOINTMENT (OUTPATIENT)
Dept: MRI IMAGING | Facility: CLINIC | Age: 65
End: 2025-01-10

## 2025-01-10 ENCOUNTER — OUTPATIENT (OUTPATIENT)
Dept: OUTPATIENT SERVICES | Facility: HOSPITAL | Age: 65
LOS: 1 days | End: 2025-01-10
Payer: MEDICARE

## 2025-01-10 DIAGNOSIS — Z00.8 ENCOUNTER FOR OTHER GENERAL EXAMINATION: ICD-10-CM

## 2025-01-10 DIAGNOSIS — Z90.79 ACQUIRED ABSENCE OF OTHER GENITAL ORGAN(S): Chronic | ICD-10-CM

## 2025-01-10 PROCEDURE — 73718 MRI LOWER EXTREMITY W/O DYE: CPT | Mod: 26,RT

## 2025-01-10 PROCEDURE — 73718 MRI LOWER EXTREMITY W/O DYE: CPT | Mod: MH

## 2025-03-12 ENCOUNTER — APPOINTMENT (OUTPATIENT)
Dept: CARDIOLOGY | Facility: CLINIC | Age: 65
End: 2025-03-12
Payer: MEDICARE

## 2025-03-12 VITALS
DIASTOLIC BLOOD PRESSURE: 74 MMHG | HEIGHT: 66 IN | OXYGEN SATURATION: 94 % | BODY MASS INDEX: 24.43 KG/M2 | RESPIRATION RATE: 17 BRPM | TEMPERATURE: 97.6 F | HEART RATE: 64 BPM | SYSTOLIC BLOOD PRESSURE: 137 MMHG | WEIGHT: 152 LBS

## 2025-03-12 DIAGNOSIS — I26.09 OTHER PULMONARY EMBOLISM WITH ACUTE COR PULMONALE: ICD-10-CM

## 2025-03-12 PROCEDURE — 99205 OFFICE O/P NEW HI 60 MIN: CPT

## 2025-03-12 PROCEDURE — 99215 OFFICE O/P EST HI 40 MIN: CPT

## 2025-06-23 ENCOUNTER — APPOINTMENT (OUTPATIENT)
Dept: CT IMAGING | Facility: CLINIC | Age: 65
End: 2025-06-23
Payer: MEDICARE

## 2025-06-23 ENCOUNTER — OUTPATIENT (OUTPATIENT)
Dept: OUTPATIENT SERVICES | Facility: HOSPITAL | Age: 65
LOS: 1 days | End: 2025-06-23
Payer: MEDICARE

## 2025-06-23 DIAGNOSIS — Z90.79 ACQUIRED ABSENCE OF OTHER GENITAL ORGAN(S): Chronic | ICD-10-CM

## 2025-06-23 DIAGNOSIS — Z00.00 ENCOUNTER FOR GENERAL ADULT MEDICAL EXAMINATION WITHOUT ABNORMAL FINDINGS: ICD-10-CM

## 2025-06-23 PROCEDURE — 71260 CT THORAX DX C+: CPT | Mod: 26

## 2025-06-23 PROCEDURE — 71260 CT THORAX DX C+: CPT | Mod: MH

## 2025-06-23 PROCEDURE — 74177 CT ABD & PELVIS W/CONTRAST: CPT | Mod: 26

## 2025-06-23 PROCEDURE — 70470 CT HEAD/BRAIN W/O & W/DYE: CPT | Mod: 26

## 2025-06-23 PROCEDURE — 74177 CT ABD & PELVIS W/CONTRAST: CPT | Mod: MH

## 2025-06-23 PROCEDURE — 70470 CT HEAD/BRAIN W/O & W/DYE: CPT | Mod: MH

## 2025-09-18 ENCOUNTER — APPOINTMENT (OUTPATIENT)
Dept: CARDIOLOGY | Facility: CLINIC | Age: 65
End: 2025-09-18
Payer: MEDICARE

## 2025-09-18 VITALS
SYSTOLIC BLOOD PRESSURE: 122 MMHG | RESPIRATION RATE: 16 BRPM | DIASTOLIC BLOOD PRESSURE: 70 MMHG | HEART RATE: 75 BPM | WEIGHT: 141 LBS | OXYGEN SATURATION: 94 % | HEIGHT: 66 IN | BODY MASS INDEX: 22.66 KG/M2 | TEMPERATURE: 97.5 F

## 2025-09-18 DIAGNOSIS — I26.99 OTHER PULMONARY EMBOLISM W/OUT ACUTE COR PULMONALE: ICD-10-CM

## 2025-09-18 PROCEDURE — G2211 COMPLEX E/M VISIT ADD ON: CPT

## 2025-09-18 PROCEDURE — 99215 OFFICE O/P EST HI 40 MIN: CPT

## 2025-09-18 PROCEDURE — 93000 ELECTROCARDIOGRAM COMPLETE: CPT
